# Patient Record
Sex: FEMALE | NOT HISPANIC OR LATINO | Employment: UNEMPLOYED | URBAN - METROPOLITAN AREA
[De-identification: names, ages, dates, MRNs, and addresses within clinical notes are randomized per-mention and may not be internally consistent; named-entity substitution may affect disease eponyms.]

---

## 2023-10-17 PROCEDURE — 88175 CYTOPATH C/V AUTO FLUID REDO: CPT

## 2023-10-19 ENCOUNTER — LAB REQUISITION (OUTPATIENT)
Dept: LAB | Facility: HOSPITAL | Age: 28
End: 2023-10-19
Payer: MEDICAID

## 2023-10-19 DIAGNOSIS — Z01.419 ENCOUNTER FOR GYNECOLOGICAL EXAMINATION (GENERAL) (ROUTINE) WITHOUT ABNORMAL FINDINGS: ICD-10-CM

## 2023-10-19 DIAGNOSIS — Z11.51 ENCOUNTER FOR SCREENING FOR HUMAN PAPILLOMAVIRUS (HPV): ICD-10-CM

## 2023-10-31 LAB
CYTOLOGY CMNT CVX/VAG CYTO-IMP: NORMAL
LAB AP HPV GENOTYPE QUESTION: YES
LAB AP HPV HR: NORMAL
LABORATORY COMMENT REPORT: NORMAL
LMP START DATE: NORMAL
PATH REPORT.TOTAL CANCER: NORMAL

## 2024-01-29 ENCOUNTER — TELEMEDICINE (OUTPATIENT)
Dept: BEHAVIORAL HEALTH | Facility: CLINIC | Age: 29
End: 2024-01-29
Payer: MEDICAID

## 2024-01-29 DIAGNOSIS — F41.8 OTHER SPECIFIED ANXIETY DISORDERS: ICD-10-CM

## 2024-01-29 DIAGNOSIS — F43.10 PTSD (POST-TRAUMATIC STRESS DISORDER): ICD-10-CM

## 2024-01-29 DIAGNOSIS — F32.89 OTHER DEPRESSION: ICD-10-CM

## 2024-01-29 DIAGNOSIS — F44.5 PSYCHOGENIC NONEPILEPTIC SEIZURE: ICD-10-CM

## 2024-01-29 PROCEDURE — 99215 OFFICE O/P EST HI 40 MIN: CPT | Performed by: PSYCHIATRY & NEUROLOGY

## 2024-01-29 RX ORDER — ALBUTEROL SULFATE 90 UG/1
AEROSOL, METERED RESPIRATORY (INHALATION)
COMMUNITY
Start: 2017-09-19 | End: 2024-02-28 | Stop reason: SDUPTHER

## 2024-01-29 RX ORDER — BUSPIRONE HYDROCHLORIDE 10 MG/1
10 TABLET ORAL EVERY 12 HOURS
COMMUNITY
End: 2024-01-29 | Stop reason: SDUPTHER

## 2024-01-29 RX ORDER — BUSPIRONE HYDROCHLORIDE 10 MG/1
10 TABLET ORAL EVERY 12 HOURS
Qty: 60 TABLET | Refills: 2 | Status: SHIPPED | OUTPATIENT
Start: 2024-01-29

## 2024-01-29 RX ORDER — SERTRALINE HYDROCHLORIDE 50 MG/1
50 TABLET, FILM COATED ORAL DAILY
Qty: 30 TABLET | Refills: 2 | Status: SHIPPED | OUTPATIENT
Start: 2024-01-29 | End: 2024-05-28 | Stop reason: SDUPTHER

## 2024-01-29 NOTE — PROGRESS NOTES
"Outpatient Psychiatry Follow up visit    Ms. Sheron Ortiz is a 27-year-old woman with a history of depression, anxiety, PTSD, psychogenic nonepileptic seizures. Follow up done virtually today. I performed this visit using real-time telehealth tools, including an audio/video connection with patient, in car outside home.  Last visit was in 2023.      Subjective:        She says she is \"just surviving.\"     She says that in August, she had to put her dog down and it was pretty hard. She says her stepmother passed away in September. She says she never had to experience losing someone that close. She says she cut herself for a day or two after her mother . She says she also stopped taking her medications for a while. She says she then started taking her buspirone 10mg twice daily. She lost her sertraline bottle in September and has not taken it since then.     She says she took prazosin and it seemed to help but has not taken it since September.      She says she is starting to seeing her therapist in person and plans to do EMDR.      Sleep - \"okay.\" Says she has been sleeping 8 to 9 hours.   Appetite is okay.   She says she cut herself a few times in September; although   She says she has chronic death wishes but denies suicidal thoughts, intent or plans.      She says she has tightness in her back when she is panicked.      Denies any sustained periods of elevated mood, decreased need for sleep, increased activity.      She says she notices shadows or figures. She says she still hears the same voice of 3 males, says they have been there since she was 9; says they have increased since her stepmother  in September.      PTSD - still has night terrors/nightmares.     She says she has had a few PNES episodes in the last several weeks. She says they are the same as before; she says she loses consciousness and has convulsions.   She says she twitches when she notices panic like symptoms but does not " "lose consciousness.   She says it correlates with stress.      She also sees a therapist Caio Malagon at [facility].      She is on Metformin - helps with PCOS.     Stopped smoking about 1 year ago.   Does not drink alcohol   Says she smokes marijuana every day for her stress, anxiety.      Current medications:   Buspirone 10mg twice daily.     Sertraline 50mg daily (not taken since September 2023)  Prazosin 1mg at bedtime (not taken since September 2023)     Past medications:   Lithium - caused stomach problems, weight gain  Vistaril   Vyvanse  Melatonin  Abilify - weight gain     Psychiatric Review Of Systems:     As above.    Medical Review Of Systems:    Pertinent items are noted in HPI.    Record Review: brief       Mental Status Evaluation:  Appearance: fair grooming.   Behavior/Attitude: Cooperative. Pleasant.   Motor: Psychomotor activity in average range. No abnormal involuntary movements.   Speech: Regular in rate, tone and volume. No pressure.  Mood: \"just surviving.\"  Affect: congruent to stated mood. Tearful when talking about losses.   Thought process: Goal-directed. Linear. Organized.  Thought content: No paranoia, delusion or ideas of reference elicited. Reports seeing some shadows. Reports chronic AH with some worsening due to stressors. Does not appear internally stimulated during visit.   Suicidal thoughts: chronic self-harming thoughts as well as death wishes but denied any suicidal ideation, intent or plans. Homicidal ideation: denied.   Insight: Fair.  Judgment: Fair.  Recent and remote memory: intact based on recall of recent and remote autobiographical memories.  Attention/concentration: intact.  Language: No aphasia or paraphasic errors.   Fund of knowledge: Average    PMH/PSH:  Past Medical History:   Diagnosis Date    Acute respiratory distress     Respiratory distress    Allergy status to unspecified drugs, medicaments and biological substances     History of seasonal " allergies    Depression, unspecified 12/21/2013    Depression    Other specified respiratory disorders     Viral respiratory infection    Personal history of other specified conditions     History of chest pain    Personal history of other specified conditions     History of wheezing    Personal history of pneumonia (recurrent)     History of pneumonia    Unspecified asthma, uncomplicated     RAD (reactive airway disease)        Meds  No current outpatient medications on file prior to visit.     No current facility-administered medications on file prior to visit.        Allergies:   Not on File      Assessment/Plan   Assessment:   Ms. Sheron Ortiz is a 28-year-old woman with a history of depression, anxiety, PTSD, psychogenic nonepileptic seizures. Follow up done virtually today.      1/29/24 - Had responded to prazosin but significant losses since last visit. She cut herself for a few days after losing her stepmother. She has been without sertraline or prazosin for a while but has resumed buspirone.   Reports ongoing PNES episodes - has not seen Dr. Sanchez; has been seeing a therapist as well.   Smokes marijuana daily.      Diagnosis:   Anxiety disorder (mixed generalized anxiety and panic disorder)  Psychogenic nonepileptic seizures  PTSD  Mood disorder - does not meet criteria for bipolar type I or II but does report brief periods of mood upswings.   R/O Mood disorder w/ psychosis vs schizoaffective disorder  ?OCD      Treatment Plan/Recommendations:   - Discussed treatment options - will restart sertraline 50mg once daily. May need to titrate dose.   - Continue buspirone 10mg twice daily.   - May consider adding prazosin for nightmares.   - Supportive therapy provided.   - Follow up with appointment to see Dr. Robert Sanchez for psychotherapy for PNES and other therapist to help cope with stressors.   - Continue to monitor for possible psychotic symptoms - reports some increase due to stress.   - Follow  up in 2 months.   - Call sooner if needed.       Review with patient: Treatment plan reviewed with the patient.  Medication risks/benefit reviewed with the patient    Jasmina Patel MD

## 2024-01-29 NOTE — PATIENT INSTRUCTIONS
Plan:   - Resume sertraline 50mg once daily. Monitor for any side effects and contact me if needed.   - Continue buspirone 10mg twice daily.   - We can consider starting prazosin after you are tolerating sertraline. Please update me in a few weeks.   - Continue to see Dr. Robert Sanchez for psychotherapy for PNES.   - Continue psychotherapy.  - Monitor for any significant change in mood, anxiety, thought or perceptions.  - Follow up in 2 months.   - Contact via [employer] or call sooner ([phone]) in case of any questions or concerns.  - Call [phone] for mental health crisis or suicidal thoughts, or call 911 or go to the nearest emergency room for emergencies.       Brain-healthy lifestyle:    Eat heart-healthy diet   Regular physical activity    Regular mental exercise such as reading books, doing puzzles, playing cards/board games etc.    Stay socially engaged   Maintain good sleep hygiene and try to get 7 to 8 hours of continuous sleep at bedtime.

## 2024-02-14 ENCOUNTER — TELEMEDICINE (OUTPATIENT)
Dept: BEHAVIORAL HEALTH | Facility: CLINIC | Age: 29
End: 2024-02-14
Payer: MEDICAID

## 2024-02-14 DIAGNOSIS — F44.5 PSYCHOGENIC NONEPILEPTIC SEIZURE: ICD-10-CM

## 2024-02-14 DIAGNOSIS — F41.8 OTHER SPECIFIED ANXIETY DISORDERS: ICD-10-CM

## 2024-02-14 DIAGNOSIS — F40.01 AGORAPHOBIA WITH PANIC ATTACKS: ICD-10-CM

## 2024-02-14 DIAGNOSIS — F43.10 PTSD (POST-TRAUMATIC STRESS DISORDER): ICD-10-CM

## 2024-02-14 PROCEDURE — 1036F TOBACCO NON-USER: CPT | Performed by: PSYCHOLOGIST

## 2024-02-14 PROCEDURE — 90837 PSYTX W PT 60 MINUTES: CPT | Performed by: PSYCHOLOGIST

## 2024-02-28 ENCOUNTER — OFFICE VISIT (OUTPATIENT)
Dept: PRIMARY CARE | Facility: CLINIC | Age: 29
End: 2024-02-28
Payer: MEDICAID

## 2024-02-28 VITALS
HEIGHT: 64 IN | BODY MASS INDEX: 43.71 KG/M2 | DIASTOLIC BLOOD PRESSURE: 74 MMHG | SYSTOLIC BLOOD PRESSURE: 118 MMHG | TEMPERATURE: 97.6 F | HEART RATE: 78 BPM | WEIGHT: 256 LBS

## 2024-02-28 DIAGNOSIS — Z00.00 PHYSICAL EXAM: Primary | ICD-10-CM

## 2024-02-28 DIAGNOSIS — J45.20 MILD INTERMITTENT REACTIVE AIRWAY DISEASE WITHOUT COMPLICATION (HHS-HCC): ICD-10-CM

## 2024-02-28 PROBLEM — L20.9 ATOPIC DERMATITIS: Status: ACTIVE | Noted: 2024-02-28

## 2024-02-28 PROBLEM — F44.5 DISSOCIATIVE CONVULSIONS: Status: ACTIVE | Noted: 2024-02-28

## 2024-02-28 PROBLEM — F90.0 ATTENTION DEFICIT HYPERACTIVITY DISORDER (ADHD), PREDOMINANTLY INATTENTIVE TYPE: Status: ACTIVE | Noted: 2024-02-28

## 2024-02-28 PROBLEM — F39 MOOD DISORDER (CMS-HCC): Status: ACTIVE | Noted: 2024-02-28

## 2024-02-28 PROBLEM — F44.5 PSYCHOGENIC NONEPILEPTIC SEIZURE: Status: ACTIVE | Noted: 2024-02-28

## 2024-02-28 PROBLEM — J45.909 REACTIVE AIRWAY DISEASE (HHS-HCC): Status: ACTIVE | Noted: 2024-02-28

## 2024-02-28 PROCEDURE — 1036F TOBACCO NON-USER: CPT | Performed by: INTERNAL MEDICINE

## 2024-02-28 PROCEDURE — 99395 PREV VISIT EST AGE 18-39: CPT | Performed by: INTERNAL MEDICINE

## 2024-02-28 RX ORDER — ALBUTEROL SULFATE 90 UG/1
2 AEROSOL, METERED RESPIRATORY (INHALATION) EVERY 4 HOURS PRN
Qty: 18 G | Refills: 3 | Status: SHIPPED | OUTPATIENT
Start: 2024-02-28

## 2024-02-28 RX ORDER — ALBUTEROL SULFATE 0.83 MG/ML
2.5 SOLUTION RESPIRATORY (INHALATION) EVERY 4 HOURS PRN
Qty: 75 ML | Refills: 3 | Status: SHIPPED | OUTPATIENT
Start: 2024-02-28

## 2024-02-28 RX ORDER — ALBUTEROL SULFATE 0.83 MG/ML
2.5 SOLUTION RESPIRATORY (INHALATION) EVERY 4 HOURS PRN
COMMUNITY
Start: 2011-08-08 | End: 2024-02-28 | Stop reason: SDUPTHER

## 2024-02-28 ASSESSMENT — PATIENT HEALTH QUESTIONNAIRE - PHQ9
SUM OF ALL RESPONSES TO PHQ9 QUESTIONS 1 AND 2: 0
2. FEELING DOWN, DEPRESSED OR HOPELESS: NOT AT ALL
1. LITTLE INTEREST OR PLEASURE IN DOING THINGS: NOT AT ALL

## 2024-02-28 ASSESSMENT — PAIN SCALES - GENERAL: PAINLEVEL: 0-NO PAIN

## 2024-02-28 NOTE — PROGRESS NOTES
"Subjective   Patient ID: Sheron Mcmullen is a 28 y.o. female who presents for Annual Exam (Pt here for physical exam. ).    HPI  1.  Physical exam. Pt is doing well. She has no complaints.  She needs med RF.  Pap: last done 10/2023      Review of Systems  All pertinent positive symptoms are included in the history of present illness.    All other systems have been reviewed and are negative and noncontributory to this patient's current ailments.    Past Medical History:   Diagnosis Date    Acute respiratory distress     Respiratory distress    Allergy status to unspecified drugs, medicaments and biological substances     History of seasonal allergies    Depression, unspecified 12/21/2013    Depression    Other specified respiratory disorders     Viral respiratory infection    Personal history of other specified conditions     History of chest pain    Personal history of other specified conditions     History of wheezing    Personal history of pneumonia (recurrent)     History of pneumonia    Unspecified asthma, uncomplicated     RAD (reactive airway disease)     Past Surgical History:   Procedure Laterality Date    OTHER SURGICAL HISTORY  10/30/2020    Tonsillectomy     Social History     Tobacco Use    Smoking status: Former     Types: Cigarettes    Smokeless tobacco: Never     No family history on file.  Allergies   Allergen Reactions    Penicillin G Hives     Immunization History   Administered Date(s) Administered    Pfizer Purple Cap SARS-CoV-2 06/22/2021, 07/13/2021     Current Outpatient Medications   Medication Instructions    albuterol (Ventolin HFA) 90 mcg/actuation inhaler 2 puffs, inhalation, Every 4 hours PRN    albuterol 2.5 mg, nebulization, Every 4 hours PRN    busPIRone (BUSPAR) 10 mg, oral, Every 12 hours    sertraline (ZOLOFT) 50 mg, oral, Daily     Objective   Visit Vitals  /74   Pulse 78   Temp 36.4 °C (97.6 °F)   Ht 1.626 m (5' 4\")   Wt 116 kg (256 lb)   BMI 43.94 kg/m²   Smoking " Status Former   BSA 2.29 m²     No visits with results within 1 Month(s) from this visit.   Latest known visit with results is:   Lab Requisition on 10/17/2023   Component Date Value    Case Report 10/17/2023                      Value:Gynecologic Cytology                              Case: [id number]                                   Authorizing Provider:  Jyotsna Serrato MD    Collected:           10/17/2023 1025              Ordering Location:     [facility]       Received:            10/19/2023 1038                                     Center                                                                       First Screen:          [facility]                                                          Specimen:    ThinPrep Liquid-Based Pap-Imaging System Screen, ECTO/ENDOCERVIX/VAGINA, SCREENING         Final Cytological Interp* 10/17/2023                      Value:This result contains rich text formatting which cannot be displayed here.      10/17/2023                      Value:This result contains rich text formatting which cannot be displayed here.    ThinPrep Imaging System 10/17/2023                      Value:This result contains rich text formatting which cannot be displayed here.    Educational Note 10/17/2023                      Value:This result contains rich text formatting which cannot be displayed here.    Perform HPV HR test? 10/17/2023 Reflex if ASCUS only     Include HPV Genotype? 10/17/2023 Yes     LMP 10/17/2023 10/11/2023      Physical Exam  CONSTITUTIONAL - well nourished, well developed, looks like stated age, in no acute distress, not ill-appearing, and not tired appearing  SKIN - normal skin color and pigmentation, normal skin turgor without rash, lesions, or nodules visualized  HEAD - no trauma, normocephalic  EYES - pupils are equal and reactive to light, extraocular muscles are intact, and normal external exam  ENT - TM's intact, no injection, no signs of  infection, uvula midline, normal tongue movement and throat normal, no exudate, nasal passage without discharge and patent  NECK - supple without rigidity, no neck mass was observed, no thyromegaly or thyroid nodules  CHEST - clear to auscultation, no wheezing, no crackles and no rales, good effort  CARDIAC - regular rate and regular rhythm, no skipped beats, no murmur  ABDOMEN - no organomegaly, soft, nontender, nondistended, normal bowel sounds, no guarding/rebound/rigidity, negative McBurney sign and negative Mcknight sign  EXTREMITIES - no edema, no deformities  NEUROLOGICAL - normal gait, normal balance, normal motor, no ataxia; alert, oriented and no focal signs  PSYCHIATRIC - alert, pleasant and cordial, age-appropriate  IMMUNOLOGIC - no cervical lymphadenopathy    Assessment/Plan   Problem List Items Addressed This Visit       Reactive airway disease    Relevant Medications    albuterol (Ventolin HFA) 90 mcg/actuation inhaler    albuterol 2.5 mg /3 mL (0.083 %) nebulizer solution     Other Visit Diagnoses       Physical exam    -  Primary         Healthy diet and exercise encouraged. Low fat, low salt diet. Drink plenty of fluids. Exercise at least 5 times/week for at least 45 minutes per day.

## 2024-03-01 NOTE — PROGRESS NOTES
Televideo Informed Consent for psychological evaluation was reviewed with the patient as follows:  There are potential benefits and risks of the use of telephone or video-conferencing that differ from in-person sessions. Specifically, the telephone or televideo system we are using may not be HIPPA compliant and may present limits to patient confidentiality. Confidentiality still applies for telepsychology services, and nobody will record the session without your permission.     Understanding and verbal agreement was attested to by the patient. Patient identity was confirmed using 3 sources, including telephone number, address and date of birth.     Sheron is a 28-year-old woman with PNES referred by Dr. Patel who returns for cognitive behavioral psychotherapy after an absence of about 14 months. She has reported that she has been experiencing seizures since .  At her last visit, she reported that these episodes occur  as frequently as 2-3 times daily.  Currently, she reports frequency of 3 times per week on average.  Unlike before, she seems to have lost any sort of prodromal awareness of any impending episode.  Triggers seem to be stress, anxiety, driving at night and exposure to bright lights. She has described the seizures as whole body tremors with fists clenching, sometimes leading to, or consequence of, panic. She recovers by sleeping.     We will resume cognitive behavioral effort to work on exposure to anxiety-provoking stimuli and an exposure plus response prevention model for dealing with agoraphobia from a cognitive behavioral perspective.    Our session addressed several issues, includin. We reviewed recent history since her last visit and her reason for returning to psychotherapy after this long absence.  We re-established a set of goals, including;  A.  Addressing agoraphobia and her strategies of fighting through it with walking and taking trips out of state  B.  Pursuit of grief  "counseling following the passing of her stepmother last year.  She notes that her stepmother  of cancer, the same cancer that her mother has, bladder cancer.  She is also experiencing grief over having to euthanize her dog.  C.  As a consequence of her grief, she states, she stopped taking sertraline and buspirone and reports that she \"kind of checked out\" since she has never lost a family member before.  The minutes fell off her radar and we reviewed the importance of resuming those medications and the care of Dr. Patel.  D.  She continues to see a therapist every other week at [facility] to review everyday life, anger, repeated regression and OCD.  E.  After she has stabilized again on her regimen of sertraline, I asked that she self monitor her episodes for 2 weeks and then we will schedule a follow-up meeting in 2 months.    2. She reports that she is experienced escalating anxiety and that she has increasingly felt emotionally detached from her friends and from herself. She characterizes herself as \"blah,\" and not present, a persistent out-of-it feeling that has been consistent for the past 2 months. Her only sense of real connection is to her boyfriend and to her mother.    3. She reports that she has had worsening compulsive behavior and she describes a ritual in which she has to rub cigarette marianne between her fingers each time she smokes a cigarette until she achieves the right field. That ritual is \"like an indoor fun\" and that it leads to a sense of relief and a sense of calm after repeating that ritual 4 times.    4. Her relationship with her boyfriend remains strong. She plans to get engaged next year. He works in a bake shop and she is quite open about the frequency with which she smokes marijuana. She reports that weed helps with anxiety and seizures. She is still meeting with a humanistic therapist twice monthly and says that the therapist knows all about her we use. She sees her " therapist every other Monday and has seen her since she was 14 years old. Together they work on anxiety, breathing and mindfulness.    5. Her episodes continue to happen frequently, and she estimates 3-4/week on average. Her episodes are related to stressors of various kinds including the stress of leaving her house. She reports deepa agoraphobia and she never leaves the house by herself as a consequence of her fear.    6. She has not worked in the past 3 years and would not know how to function at work or being around strangers. Her most recent employment was at [employer] and previously she had worked at fast food establishments. 2 years ago she was awarded Social Security disability and reports that she has enough money to handle her own expenses. Pointedly, we discussed that she has no financial motivation to address her agoraphobia for to return to work.    7. In pursuing exactly what her motives are, the best she could say was that she does not want to continue constantly to feel anxious and emotionally detached. We detailed, as we have previously, that avoidance is an anxiety reinforcer and that my recommendation would be that she embark on a structured program of exposure to anxiety-provoking events, including leaving her house. She has a very good and trusting relationship with her therapist who works for humanistic counseling in their [place] office. I suggested that I would be happy to talk with her therapist, learn about whether she has conceptualized her anxiety issues as I have, and to determine whether she would be able to carry out a structured exposure approach to addressing anxiety, PTSD and agoraphobia. The patient is on board with this recommendation and we will secure a release of information so that I can talk to her therapist.

## 2024-04-01 ENCOUNTER — APPOINTMENT (OUTPATIENT)
Dept: BEHAVIORAL HEALTH | Facility: CLINIC | Age: 29
End: 2024-04-01
Payer: MEDICAID

## 2024-04-17 ENCOUNTER — APPOINTMENT (OUTPATIENT)
Dept: BEHAVIORAL HEALTH | Facility: CLINIC | Age: 29
End: 2024-04-17
Payer: MEDICAID

## 2024-04-22 ENCOUNTER — TELEMEDICINE (OUTPATIENT)
Dept: BEHAVIORAL HEALTH | Facility: CLINIC | Age: 29
End: 2024-04-22
Payer: MEDICAID

## 2024-04-22 DIAGNOSIS — F40.01 AGORAPHOBIA WITH PANIC ATTACKS: ICD-10-CM

## 2024-04-22 DIAGNOSIS — F43.10 PTSD (POST-TRAUMATIC STRESS DISORDER): ICD-10-CM

## 2024-04-22 DIAGNOSIS — F32.89 OTHER DEPRESSION: ICD-10-CM

## 2024-04-22 DIAGNOSIS — F44.5 PSYCHOGENIC NONEPILEPTIC SEIZURE: ICD-10-CM

## 2024-04-22 PROCEDURE — 90832 PSYTX W PT 30 MINUTES: CPT | Performed by: PSYCHOLOGIST
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
## 2024-04-26 ENCOUNTER — TELEMEDICINE (OUTPATIENT)
Dept: BEHAVIORAL HEALTH | Facility: CLINIC | Age: 29
End: 2024-04-26
Payer: MEDICAID

## 2024-04-26 DIAGNOSIS — F40.01 AGORAPHOBIA WITH PANIC ATTACKS: ICD-10-CM

## 2024-04-26 DIAGNOSIS — F41.8 OTHER SPECIFIED ANXIETY DISORDERS: ICD-10-CM

## 2024-04-26 DIAGNOSIS — F32.89 OTHER DEPRESSION: ICD-10-CM

## 2024-04-26 DIAGNOSIS — F43.10 PTSD (POST-TRAUMATIC STRESS DISORDER): ICD-10-CM

## 2024-04-26 DIAGNOSIS — F44.5 PSYCHOGENIC NONEPILEPTIC SEIZURE: ICD-10-CM

## 2024-04-26 PROCEDURE — 99213 OFFICE O/P EST LOW 20 MIN: CPT | Performed by: PSYCHIATRY & NEUROLOGY

## 2024-04-26 NOTE — PATIENT INSTRUCTIONS
Plan:       - Continue sertraline 50mg daily.   - Continue buspirone 10mg twice daily.   - Continue to see Dr. Robert Sanchez for psychotherapy for PNES and other therapist to help cope with stressors.   - Agree with trying an emotional support animal.   - Continue to monitor for any changes in mood or thinking.    - Follow up in 3 months.   - Contact via [employer] or call sooner ([phone]) in case of any questions or concerns.  - Call [phone] for mental health crisis or suicidal thoughts, or call 911 or go to the nearest emergency room for emergencies.

## 2024-04-26 NOTE — PROGRESS NOTES
"Outpatient Psychiatry Follow up visit    Ms. Sheron Ortiz is a 27-year-old woman with a history of depression, anxiety, PTSD, psychogenic nonepileptic seizures. Follow up done virtually today. I performed this visit using real-time telehealth tools, including an audio/video connection with patient (at home).   Her mother joined with patient's permission.     Subjective:      She says she is \"okay.\"    She says she is doing \"pretty good.\" She feels sertraline 50mg daily is helping.   She says she is taking buspirone 10mg daily, not twice daily. She feels she is doing well on     She is planning to get a puppy as an emotional support animal.  She feels it will help her with her anxiety.     Her mother notes that patient had a change in her relationship recently - there was a lot of stress but with the change, she has not been as stressed. She also notes that Sheron wants to be able to go out on her own and an emotional support animal would help.     She says she is starting to seeing her therapist in person and plans to do EMDR.      Sleep - \"okay.\" Says she has been sleeping 8 to 9 hours.   Appetite is okay.   No self-cutting behaviors recently.   She denies suicidal thoughts, intent or plans.      They report less stress.      Denies any sustained periods of elevated mood, decreased need for sleep, increased activity.      PTSD - says she has not had night terrors for 2 weeks.     She denies any panic attacks.     She says she has had some seizures in her sleep.      She also sees a therapist Caio Malagon at [facility].      She is on Metformin - helps with PCOS.     Stopped smoking about 1 year ago.   Does not drink alcohol   Says she smokes marijuana every day for her stress, anxiety.      Current medications:   Buspirone 10mg once daily  Sertraline 50mg daily.     Past medications:   Prazosin 1mg at bedtime (not taken since September 2023)  Lithium - caused stomach problems, weight " "gain  Vistaril   Vyvanse  Melatonin  Abilify - weight gain     Psychiatric Review Of Systems:     As above.    Medical Review Of Systems:    Pertinent items are noted in HPI.    Record Review: brief       Mental Status Evaluation:  Appearance: fair grooming.   Behavior/Attitude: Cooperative. Pleasant.   Motor: Psychomotor activity in average range. No abnormal involuntary movements.   Speech: Regular in rate, tone and volume. No pressure.  Mood: \"okay.\"  Affect: congruent to stated mood. Not tearful today.   Thought process: Goal-directed. Linear. Organized.  Thought content: No paranoia, delusion or ideas of reference elicited. Did not appear internally stimulated. Reports chronic AH.   Suicidal thoughts: denies any recent self-harming thoughts or behaviors or any suicidal ideation, intent or plans.   Homicidal ideation: denied.   Insight: Fair.  Judgment: Fair.  Recent and remote memory: intact based on recall of recent and remote autobiographical memories.  Attention/concentration: intact.  Language: No aphasia or paraphasic errors.   Fund of knowledge: Average    PMH/PSH:  Past Medical History:   Diagnosis Date    Acute respiratory distress     Respiratory distress    Allergy status to unspecified drugs, medicaments and biological substances     History of seasonal allergies    Depression, unspecified 12/21/2013    Depression    Other specified respiratory disorders     Viral respiratory infection    Personal history of other specified conditions     History of chest pain    Personal history of other specified conditions     History of wheezing    Personal history of pneumonia (recurrent)     History of pneumonia    Unspecified asthma, uncomplicated ([facility])     RAD (reactive airway disease)        Meds  Current Outpatient Medications on File Prior to Visit   Medication Sig Dispense Refill    albuterol (Ventolin HFA) 90 mcg/actuation inhaler Inhale 2 puffs every 4 hours if needed for wheezing or shortness of " breath. 18 g 3    albuterol 2.5 mg /3 mL (0.083 %) nebulizer solution Take 3 mL (2.5 mg) by nebulization every 4 hours if needed for wheezing or shortness of breath. 75 mL 3    busPIRone (Buspar) 10 mg tablet Take 1 tablet (10 mg) by mouth every 12 hours. 60 tablet 2    sertraline (Zoloft) 50 mg tablet Take 1 tablet (50 mg) by mouth once daily. 30 tablet 2     No current facility-administered medications on file prior to visit.        Allergies:   Allergies   Allergen Reactions    Penicillin G Hives         Assessment/Plan   Assessment:   Ms. Sheron Ortiz is a 28-year-old woman with a history of depression, anxiety, PTSD, psychogenic nonepileptic seizures. Follow up done virtually today.      4/26/24 - Reports improved mood and anxiety on current regimen (sertraline 50mg/day and buspirone 10mg/day). Had a recent change in relationship status that has decreased stress. 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   Wants to get a puppy as an emotional support animal.   Smokes marijuana daily.      Diagnosis:   Anxiety disorder (mixed generalized anxiety and panic disorder)  Psychogenic nonepileptic seizures  PTSD  Mood disorder - does not meet criteria for bipolar type I or II but does report brief periods of mood upswings.   R/O Mood disorder w/ psychosis vs schizoaffective disorder  ?OCD    Treatment Plan/Recommendations:   - Continue sertraline 50mg daily.   - Continue buspirone 10mg twice daily.   - Supportive therapy provided.   - Continue to see Dr. Robert Sanchez for psychotherapy for PNES and other therapist to help cope with stressors.   - Agree with trying an emotional support animal to help improve functioning.   - Continue to monitor for possible psychotic symptoms - reports some increase due to stress.   - Follow up in 3 months.         Review with patient: Treatment plan reviewed with the patient.  Medication risks/benefit reviewed with the patient    Jasmina Patel MD

## 2024-05-28 DIAGNOSIS — F32.89 OTHER DEPRESSION: ICD-10-CM

## 2024-05-28 RX ORDER — SERTRALINE HYDROCHLORIDE 50 MG/1
50 TABLET, FILM COATED ORAL DAILY
Qty: 90 TABLET | Refills: 3 | Status: SHIPPED | OUTPATIENT
Start: 2024-05-28 | End: 2025-05-28

## 2024-07-26 ENCOUNTER — APPOINTMENT (OUTPATIENT)
Dept: PRIMARY CARE | Facility: CLINIC | Age: 29
End: 2024-07-26
Payer: MEDICAID

## 2024-08-27 ENCOUNTER — OFFICE VISIT (OUTPATIENT)
Dept: PRIMARY CARE | Facility: CLINIC | Age: 29
End: 2024-08-27
Payer: MEDICAID

## 2024-08-27 VITALS
WEIGHT: 259 LBS | TEMPERATURE: 97.1 F | DIASTOLIC BLOOD PRESSURE: 78 MMHG | SYSTOLIC BLOOD PRESSURE: 120 MMHG | BODY MASS INDEX: 44.46 KG/M2 | HEART RATE: 73 BPM | OXYGEN SATURATION: 97 %

## 2024-08-27 DIAGNOSIS — E28.2 PCOS (POLYCYSTIC OVARIAN SYNDROME): Primary | ICD-10-CM

## 2024-08-27 DIAGNOSIS — R56.9 SEIZURE (MULTI): ICD-10-CM

## 2024-08-27 PROCEDURE — 1036F TOBACCO NON-USER: CPT | Performed by: INTERNAL MEDICINE

## 2024-08-27 PROCEDURE — 99214 OFFICE O/P EST MOD 30 MIN: CPT | Performed by: INTERNAL MEDICINE

## 2024-08-27 RX ORDER — METFORMIN HYDROCHLORIDE 500 MG/1
500 TABLET ORAL
Qty: 180 TABLET | Refills: 3 | Status: SHIPPED | OUTPATIENT
Start: 2024-08-27 | End: 2025-08-27

## 2024-08-27 ASSESSMENT — ENCOUNTER SYMPTOMS
POLYDIPSIA: 0
FEVER: 0
SHORTNESS OF BREATH: 0
CONSTIPATION: 0
HEADACHES: 0
SEIZURES: 1
DIZZINESS: 0
UNEXPECTED WEIGHT CHANGE: 0
CHILLS: 0
DIARRHEA: 0
POLYPHAGIA: 0
VOMITING: 0
ABDOMINAL PAIN: 0
NAUSEA: 0

## 2024-08-27 ASSESSMENT — PAIN SCALES - GENERAL: PAINLEVEL: 0-NO PAIN

## 2024-08-27 ASSESSMENT — PATIENT HEALTH QUESTIONNAIRE - PHQ9
SUM OF ALL RESPONSES TO PHQ9 QUESTIONS 1 AND 2: 0
1. LITTLE INTEREST OR PLEASURE IN DOING THINGS: NOT AT ALL
2. FEELING DOWN, DEPRESSED OR HOPELESS: NOT AT ALL

## 2024-08-27 NOTE — PROGRESS NOTES
Subjective   Patient ID: Sheron Mcmullen is a 28 y.o. female who presents for MED REVIEW.    This is a 28 y.o. with PCOS who would like to be restarted on Metformin. She states that it helped her to lose weight previously. She is also interested in labs.           Review of Systems   Constitutional:  Negative for chills, fever and unexpected weight change.   Respiratory:  Negative for shortness of breath.    Cardiovascular:  Negative for chest pain.   Gastrointestinal:  Negative for abdominal pain, constipation, diarrhea, nausea and vomiting.   Endocrine: Negative for polydipsia, polyphagia and polyuria.   Neurological:  Positive for seizures. Negative for dizziness and headaches.       Objective   /78   Pulse 73   Temp 36.2 °C (97.1 °F)   Wt 117 kg (259 lb)   SpO2 97%   BMI 44.46 kg/m²     Physical Exam  Vitals reviewed.   Constitutional:       General: She is not in acute distress.     Appearance: Normal appearance. She is obese. She is not ill-appearing.   Cardiovascular:      Rate and Rhythm: Normal rate and regular rhythm.      Heart sounds: Normal heart sounds.   Pulmonary:      Effort: Pulmonary effort is normal.      Breath sounds: Normal breath sounds.   Musculoskeletal:         General: No swelling. Normal range of motion.   Skin:     General: Skin is warm and dry.   Neurological:      General: No focal deficit present.      Mental Status: She is alert and oriented to person, place, and time.   Psychiatric:         Mood and Affect: Mood normal.         Behavior: Behavior normal.         Assessment/Plan   Diagnoses and all orders for this visit:  PCOS (polycystic ovarian syndrome)  -     Lipid Panel; Future  -     Comprehensive Metabolic Panel; Future  -     CBC; Future  -     TSH with reflex to Free T4 if abnormal; Future  -     metFORMIN (Glucophage) 500 mg tablet; Take 1 tablet (500 mg) by mouth 2 times daily (morning and late afternoon).  -     Hemoglobin A1C; Future  Seizure  (Multi)  Comments:  Pseudoseizures have been stable.

## 2024-10-29 PROCEDURE — 87591 N.GONORRHOEAE DNA AMP PROB: CPT

## 2024-10-29 PROCEDURE — 87491 CHLMYD TRACH DNA AMP PROBE: CPT

## 2024-10-31 ENCOUNTER — LAB REQUISITION (OUTPATIENT)
Dept: LAB | Facility: HOSPITAL | Age: 29
End: 2024-10-31
Payer: MEDICAID

## 2024-10-31 DIAGNOSIS — Z11.51 ENCOUNTER FOR SCREENING FOR HUMAN PAPILLOMAVIRUS (HPV): ICD-10-CM

## 2024-10-31 DIAGNOSIS — Z12.4 ENCOUNTER FOR SCREENING FOR MALIGNANT NEOPLASM OF CERVIX: ICD-10-CM

## 2024-10-31 DIAGNOSIS — Z01.419 ENCOUNTER FOR GYNECOLOGICAL EXAMINATION (GENERAL) (ROUTINE) WITHOUT ABNORMAL FINDINGS: ICD-10-CM

## 2024-10-31 LAB
C TRACH RRNA SPEC QL NAA+PROBE: NEGATIVE
N GONORRHOEA DNA SPEC QL PROBE+SIG AMP: NEGATIVE

## 2024-11-13 ENCOUNTER — OFFICE VISIT (OUTPATIENT)
Dept: PRIMARY CARE | Facility: CLINIC | Age: 29
End: 2024-11-13
Payer: MEDICAID

## 2024-11-13 VITALS
DIASTOLIC BLOOD PRESSURE: 80 MMHG | SYSTOLIC BLOOD PRESSURE: 114 MMHG | WEIGHT: 260 LBS | BODY MASS INDEX: 44.39 KG/M2 | HEART RATE: 86 BPM | OXYGEN SATURATION: 98 % | HEIGHT: 64 IN | TEMPERATURE: 97.1 F

## 2024-11-13 DIAGNOSIS — G44.85 PRIMARY STABBING HEADACHE: Primary | ICD-10-CM

## 2024-11-13 DIAGNOSIS — E66.01 MORBID OBESITY WITH BMI OF 40.0-44.9, ADULT (MULTI): ICD-10-CM

## 2024-11-13 DIAGNOSIS — N94.6 DYSMENORRHEA: ICD-10-CM

## 2024-11-13 PROCEDURE — 3008F BODY MASS INDEX DOCD: CPT | Performed by: INTERNAL MEDICINE

## 2024-11-13 PROCEDURE — 99213 OFFICE O/P EST LOW 20 MIN: CPT | Performed by: INTERNAL MEDICINE

## 2024-11-13 PROCEDURE — 1036F TOBACCO NON-USER: CPT | Performed by: INTERNAL MEDICINE

## 2024-11-13 RX ORDER — FLUCONAZOLE 150 MG/1
150 TABLET ORAL DAILY
COMMUNITY
Start: 2024-10-29

## 2024-11-13 RX ORDER — IBUPROFEN 800 MG/1
800 TABLET ORAL 3 TIMES DAILY PRN
Qty: 90 TABLET | Refills: 3 | Status: SHIPPED | OUTPATIENT
Start: 2024-11-13

## 2024-11-13 ASSESSMENT — ENCOUNTER SYMPTOMS
PHOTOPHOBIA: 0
VOMITING: 0
WEAKNESS: 0
SHORTNESS OF BREATH: 0
FATIGUE: 0
ACTIVITY CHANGE: 0
NUMBNESS: 0
HEADACHES: 1
PALPITATIONS: 0
CHILLS: 0
FEVER: 0
DIZZINESS: 0
DIARRHEA: 0
APPETITE CHANGE: 0

## 2024-11-13 ASSESSMENT — PATIENT HEALTH QUESTIONNAIRE - PHQ9
2. FEELING DOWN, DEPRESSED OR HOPELESS: NOT AT ALL
1. LITTLE INTEREST OR PLEASURE IN DOING THINGS: NOT AT ALL
SUM OF ALL RESPONSES TO PHQ9 QUESTIONS 1 AND 2: 0

## 2024-11-13 ASSESSMENT — PAIN SCALES - GENERAL: PAINLEVEL_OUTOF10: 2

## 2024-11-14 ENCOUNTER — TELEPHONE (OUTPATIENT)
Dept: SURGERY | Facility: CLINIC | Age: 29
End: 2024-11-14
Payer: MEDICAID

## 2024-11-14 NOTE — PROGRESS NOTES
"Subjective   Patient ID: Sheron Mcmullen is a 28 y.o. female who presents for head ach.    This is a 28 y.o. who states that she had a stabbing HA on the left posterior aspect of her head. It lasted a few days and correlated with the starting of her menses. She did not take any medication. No fever, chills, dizziness, visual disturbance, light sensitivity.  She is also c/o horrible menstrual cramps. Her cramps are incapacitating on the first day of her menses.  Pt is also concerned about her weight and is considering bariatric surgery.         Review of Systems   Constitutional:  Negative for activity change, appetite change, chills, fatigue and fever.   Eyes:  Negative for photophobia and visual disturbance.   Respiratory:  Negative for shortness of breath.    Cardiovascular:  Negative for chest pain and palpitations.   Gastrointestinal:  Negative for diarrhea and vomiting.   Genitourinary:         See HPI   Neurological:  Positive for headaches. Negative for dizziness, weakness and numbness.       Objective   /80   Pulse 86   Temp 36.2 °C (97.1 °F)   Ht 1.626 m (5' 4\")   Wt 118 kg (260 lb)   SpO2 98%   BMI 44.63 kg/m²     Physical Exam  Vitals reviewed.   Constitutional:       General: She is not in acute distress.     Appearance: Normal appearance. She is obese. She is not ill-appearing.   Eyes:      Extraocular Movements: Extraocular movements intact.   Cardiovascular:      Rate and Rhythm: Normal rate and regular rhythm.      Heart sounds: Normal heart sounds.   Pulmonary:      Effort: Pulmonary effort is normal.      Breath sounds: Normal breath sounds.   Skin:     General: Skin is warm and dry.   Neurological:      General: No focal deficit present.      Mental Status: She is alert and oriented to person, place, and time.   Psychiatric:         Mood and Affect: Mood normal.         Assessment/Plan   Diagnoses and all orders for this visit:  Primary stabbing " headache  Comments:  Resolved  Dysmenorrhea  Comments:  Start taking Ibuprofen, 48 hrs prior to start of menses  Orders:  -     ibuprofen 800 mg tablet; Take 1 tablet (800 mg) by mouth 3 times a day as needed for mild pain (1 - 3) (pain).  Morbid obesity with BMI of 40.0-44.9, adult (Multi)  -     Referral to Bariatric Surgery; Future  Healthy diet and exercise encouraged. Low fat, low salt diet. Drink plenty of fluids. Exercise at least 5 times/week for at least 45 minutes per day.

## 2024-11-15 LAB
CYTOLOGY CMNT CVX/VAG CYTO-IMP: NORMAL
LAB AP HPV GENOTYPE QUESTION: YES
LAB AP HPV HR: NORMAL
LAB AP PAP ADDITIONAL TESTS: NORMAL
LAB AP PREVIOUS ABNORMAL HISTORY: NORMAL
LABORATORY COMMENT REPORT: NORMAL
LMP START DATE: NORMAL
PATH REPORT.TOTAL CANCER: NORMAL

## 2024-11-21 ENCOUNTER — TELEPHONE (OUTPATIENT)
Dept: SURGERY | Facility: CLINIC | Age: 29
End: 2024-11-21
Payer: MEDICAID

## 2024-12-18 ENCOUNTER — OFFICE VISIT (OUTPATIENT)
Dept: PRIMARY CARE | Facility: CLINIC | Age: 29
End: 2024-12-18
Payer: MEDICAID

## 2024-12-18 VITALS
DIASTOLIC BLOOD PRESSURE: 78 MMHG | HEART RATE: 85 BPM | TEMPERATURE: 97.7 F | WEIGHT: 256 LBS | BODY MASS INDEX: 43.94 KG/M2 | OXYGEN SATURATION: 92 % | SYSTOLIC BLOOD PRESSURE: 120 MMHG

## 2024-12-18 DIAGNOSIS — J06.9 UPPER RESPIRATORY TRACT INFECTION, UNSPECIFIED TYPE: Primary | ICD-10-CM

## 2024-12-18 PROCEDURE — 1036F TOBACCO NON-USER: CPT | Performed by: INTERNAL MEDICINE

## 2024-12-18 PROCEDURE — 99213 OFFICE O/P EST LOW 20 MIN: CPT | Performed by: INTERNAL MEDICINE

## 2024-12-18 RX ORDER — BENZONATATE 200 MG/1
200 CAPSULE ORAL 3 TIMES DAILY PRN
Qty: 30 CAPSULE | Refills: 0 | Status: SHIPPED | OUTPATIENT
Start: 2024-12-18

## 2024-12-18 RX ORDER — FLUTICASONE PROPIONATE 50 MCG
1 SPRAY, SUSPENSION (ML) NASAL DAILY
Qty: 16 G | Refills: 0 | Status: SHIPPED | OUTPATIENT
Start: 2024-12-18 | End: 2025-12-18

## 2024-12-18 RX ORDER — CETIRIZINE HYDROCHLORIDE, PSEUDOEPHEDRINE HYDROCHLORIDE 5; 120 MG/1; MG/1
1 TABLET, FILM COATED, EXTENDED RELEASE ORAL 2 TIMES DAILY PRN
Qty: 14 TABLET | Refills: 0 | Status: SHIPPED | OUTPATIENT
Start: 2024-12-18

## 2024-12-18 ASSESSMENT — ENCOUNTER SYMPTOMS
DIZZINESS: 0
FATIGUE: 0
FEVER: 0
COUGH: 1
SHORTNESS OF BREATH: 0
HEADACHES: 0
WHEEZING: 0
CHILLS: 0

## 2024-12-18 ASSESSMENT — PATIENT HEALTH QUESTIONNAIRE - PHQ9
2. FEELING DOWN, DEPRESSED OR HOPELESS: NOT AT ALL
SUM OF ALL RESPONSES TO PHQ9 QUESTIONS 1 AND 2: 0
1. LITTLE INTEREST OR PLEASURE IN DOING THINGS: NOT AT ALL

## 2024-12-18 ASSESSMENT — PAIN SCALES - GENERAL: PAINLEVEL_OUTOF10: 0-NO PAIN

## 2024-12-18 NOTE — PROGRESS NOTES
Subjective   Patient ID: Sheron Mcmullen is a 29 y.o. female who presents for Cough.    This is a 29 y.o. who was diagnosed with a URI on 12/10. Pt states that she feels better, but she still has a cough. Her sputum is clear. She continues to have ear discomfort and fullness. No drainage, fever, chills.         Review of Systems   Constitutional:  Negative for chills, fatigue and fever.   HENT:          See HPI   Respiratory:  Positive for cough. Negative for shortness of breath and wheezing.    Cardiovascular:  Negative for chest pain.   Neurological:  Negative for dizziness and headaches.       Objective   /78   Pulse 85   Temp 36.5 °C (97.7 °F)   Wt 116 kg (256 lb)   SpO2 92%   BMI 43.94 kg/m²     Physical Exam  Vitals reviewed.   Constitutional:       General: She is not in acute distress.     Appearance: Normal appearance. She is not ill-appearing or toxic-appearing.   HENT:      Right Ear: Tympanic membrane normal.      Left Ear: Tympanic membrane normal.      Nose: Nose normal.   Cardiovascular:      Rate and Rhythm: Normal rate and regular rhythm.      Heart sounds: Normal heart sounds.   Pulmonary:      Effort: Pulmonary effort is normal.      Breath sounds: Normal breath sounds. No wheezing, rhonchi or rales.   Neurological:      General: No focal deficit present.      Mental Status: She is alert and oriented to person, place, and time.   Psychiatric:         Mood and Affect: Mood normal.         Assessment/Plan   Diagnoses and all orders for this visit:  Upper respiratory tract infection, unspecified type  Comments:  ER records reviewed  Orders:  -     cetirizine-pseudoephedrine (ZyrTEC-D) 5-120 mg 12 hr tablet; Take 1 tablet by mouth 2 times a day as needed for allergies.  -     fluticasone (Flonase) 50 mcg/actuation nasal spray; Administer 1 spray into each nostril once daily. Shake gently. Before first use, prime pump. After use, clean tip and replace cap.  -     benzonatate (Tessalon)  200 mg capsule; Take 1 capsule (200 mg) by mouth 3 times a day as needed for cough. Do not crush or chew.

## 2025-01-03 ENCOUNTER — APPOINTMENT (OUTPATIENT)
Dept: BEHAVIORAL HEALTH | Facility: CLINIC | Age: 30
End: 2025-01-03
Payer: MEDICAID

## 2025-01-03 DIAGNOSIS — F41.8 OTHER SPECIFIED ANXIETY DISORDERS: ICD-10-CM

## 2025-01-03 DIAGNOSIS — F32.89 OTHER DEPRESSION: ICD-10-CM

## 2025-01-03 DIAGNOSIS — F44.5 PSYCHOGENIC NONEPILEPTIC SEIZURE: ICD-10-CM

## 2025-01-03 DIAGNOSIS — F43.10 PTSD (POST-TRAUMATIC STRESS DISORDER): ICD-10-CM

## 2025-01-03 PROCEDURE — 99214 OFFICE O/P EST MOD 30 MIN: CPT | Performed by: PSYCHIATRY & NEUROLOGY

## 2025-01-03 RX ORDER — PRAZOSIN HYDROCHLORIDE 1 MG/1
1 CAPSULE ORAL NIGHTLY
Qty: 30 CAPSULE | Refills: 3 | Status: SHIPPED | OUTPATIENT
Start: 2025-01-03 | End: 2026-01-03

## 2025-01-03 NOTE — PROGRESS NOTES
"Outpatient Psychiatry Follow up visit    Ms. Sheron Ortiz is a 27-year-old woman with a history of depression, anxiety, PTSD, psychogenic nonepileptic seizures. Follow up done virtually today.     Virtual or Telephone Consent    An interactive audio and video telecommunication system which permits real time communications between the patient (at the originating site) and provider (at the distant site) was utilized to provide this telehealth service.   Verbal consent was requested and obtained from Sheron Mcmullen on this date, 01/03/25 for a telehealth visit.     Subjective:      She says she is \"okay.\"  She says she has an emotional support dog and she has helped her quite a bit.     She says she is doing \"better.\" She says she was \"depressed\" around the election but is now feeling better. She feels sertraline 50mg daily is helping.   She says she is taking buspirone 10mg twice daily - she says it has helped.     She says that she has had a few panic attacks in the evening.     She says that she has had \"seizures\" because she was \"stressed out\" around the elections but they have decreased. She says that if she feels it coming on, she tries to not get \"so revved up,\" and then just \"let it happen.\"     She says she is seeing her therapist.      Sleep - \"better.\" She says she still has \"night frights.\" She takes sertraline at nighttime because it was easy to remember.   Appetite is okay.   She says she has had thoughts of cutting but has not done it. She says journaling helps.   She denies suicidal thoughts, intent or plans.      She says she was \"manic\" for 4 or 5 days - she felt \"euphoric,\" and thought she had \"figured out world peace.\" She says she \"crashed\" for 3 days after that.   Denies any sustained periods of decreased need for sleep, increased activity.      PTSD - says she continues to have \"night frights.\"     She says she has heard \"3 voices\" \"all day every day\" since she was 9. She says it " "is louder when she is stressed, or depressed. She says when she is calm, the voices are still there but they \"subside.\"     She denies any panic attacks.      She also sees a therapist Caio Malagon at [facility].      She is on Metformin - helps with PCOS.     Stopped smoking about 1 year ago.   Does not drink alcohol   Says she smokes marijuana every day for her stress, anxiety.      Current medications:   Buspirone 10mg twice daily  Sertraline 50mg daily.     Past medications:   Prazosin 1mg at bedtime (not taken since September 2023)  Lithium - caused stomach problems, weight gain  Vistaril   Vyvanse  Melatonin  Abilify - weight gain     Psychiatric Review Of Systems:     As above.    Medical Review Of Systems:    Pertinent items are noted in HPI.    Record Review: brief       Mental Status Evaluation:  Appearance: fair grooming. Had her dog with her.  Behavior/Attitude: Cooperative. Pleasant.   Motor: Psychomotor activity in average range. No abnormal involuntary movements.   Speech: Regular in rate, tone and volume. No pressure.  Mood: \"okay.\"  Affect: congruent to stated mood. Not tearful or dysphoric.   Thought process: Goal-directed. Linear. Organized.  Thought content: No paranoia, delusion or ideas of reference elicited. Did not appear internally stimulated. Reports chronic AH.   Suicidal thoughts: reports intermittent self-harming thoughts but denies any self-harming behaviors or any suicidal ideation, intent or plans.   Homicidal ideation: denied.   Insight: Fair.  Judgment: Fair.  Recent and remote memory: intact based on recall of recent and remote autobiographical memories.  Attention/concentration: intact.  Language: No aphasia or paraphasic errors.   Fund of knowledge: Average    PMH/PSH:  Past Medical History:   Diagnosis Date    Acute respiratory distress     Respiratory distress    Allergy status to unspecified drugs, medicaments and biological substances     History of seasonal " allergies    Depression, unspecified 12/21/2013    Depression    Other specified respiratory disorders     Viral respiratory infection    Personal history of other specified conditions     History of chest pain    Personal history of other specified conditions     History of wheezing    Personal history of pneumonia (recurrent)     History of pneumonia    Unspecified asthma, uncomplicated ([facility])     RAD (reactive airway disease)        Meds  Current Outpatient Medications on File Prior to Visit   Medication Sig Dispense Refill    albuterol (Ventolin HFA) 90 mcg/actuation inhaler Inhale 2 puffs every 4 hours if needed for wheezing or shortness of breath. 18 g 3    albuterol 2.5 mg /3 mL (0.083 %) nebulizer solution Take 3 mL (2.5 mg) by nebulization every 4 hours if needed for wheezing or shortness of breath. 75 mL 3    benzonatate (Tessalon) 200 mg capsule Take 1 capsule (200 mg) by mouth 3 times a day as needed for cough. Do not crush or chew. 30 capsule 0    busPIRone (Buspar) 10 mg tablet Take 1 tablet (10 mg) by mouth every 12 hours. 60 tablet 2    cetirizine-pseudoephedrine (ZyrTEC-D) 5-120 mg 12 hr tablet Take 1 tablet by mouth 2 times a day as needed for allergies. 14 tablet 0    fluconazole (Diflucan) 150 mg tablet Take 1 tablet (150 mg) by mouth once daily.      fluticasone (Flonase) 50 mcg/actuation nasal spray Administer 1 spray into each nostril once daily. Shake gently. Before first use, prime pump. After use, clean tip and replace cap. 16 g 0    ibuprofen 800 mg tablet Take 1 tablet (800 mg) by mouth 3 times a day as needed for mild pain (1 - 3) (pain). 90 tablet 3    metFORMIN (Glucophage) 500 mg tablet Take 1 tablet (500 mg) by mouth 2 times daily (morning and late afternoon). 180 tablet 3    sertraline (Zoloft) 50 mg tablet Take 1 tablet (50 mg) by mouth once daily. 90 tablet 3     No current facility-administered medications on file prior to visit.        Allergies:   Allergies   Allergen  "Reactions    Penicillin G Hives         Assessment/Plan   Assessment:   Ms. Sheron Ortiz is a 28-year-old woman with a history of depression, anxiety, PTSD, psychogenic nonepileptic seizures. Follow up done virtually today.      1/3/25 - Reports improved mood and anxiety on current regimen (sertraline 50mg/day and buspirone 10mg/day). 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   Also reports \"night frights.\"   Has an emotional support animal, which has been very helpful.    Smokes marijuana daily.      Diagnosis:   Anxiety disorder (mixed generalized anxiety and panic disorder)  Psychogenic nonepileptic seizures  PTSD  Mood disorder - does not meet criteria for bipolar type I or II but does report brief periods of mood upswings.   R/O Mood disorder w/ psychosis vs schizoaffective disorder  ?OCD    Treatment Plan/Recommendations:   - Trial prazosin for nightmares. Discussed side effects and provided education on orthostatic hypotension.   - Continue sertraline 50mg daily.   - Continue buspirone 10mg twice daily.   - Supportive therapy provided.   - Continue psychotherapy.   - Continue to monitor for possible psychotic symptoms - does not appear to be psychotic; may be more related to PTSD.   - Follow up in 3 months.     Review with patient: Treatment plan reviewed with the patient.  Medication risks/benefit reviewed with the patient    Jasmina Patel MD  "

## 2025-01-03 NOTE — PATIENT INSTRUCTIONS
Plan:   - Start prazosin 1mg at bedtime. Monitor for any side effects, including low blood pressure. Make sure you are well hydrated; change position from lying down or sitting to standing gradually.   - Continue sertraline 50mg daily.   - Continue buspirone 10mg twice daily.   - Continue psychotherapy.   - Follow up in 3 months.   - Contact via [facility] or call sooner ([phone]) in case of any questions or concerns.  - Call [phone] for mental health crisis or suicidal thoughts, or call 911 or go to the nearest emergency room for emergencies.

## 2025-03-13 ENCOUNTER — PATIENT MESSAGE (OUTPATIENT)
Dept: BEHAVIORAL HEALTH | Facility: CLINIC | Age: 30
End: 2025-03-13
Payer: MEDICAID

## 2025-03-13 DIAGNOSIS — F32.89 OTHER DEPRESSION: ICD-10-CM

## 2025-03-13 RX ORDER — SERTRALINE HYDROCHLORIDE 50 MG/1
50 TABLET, FILM COATED ORAL DAILY
Qty: 90 TABLET | Refills: 1 | Status: SHIPPED | OUTPATIENT
Start: 2025-03-13 | End: 2026-03-13

## 2025-03-13 RX ORDER — BUSPIRONE HYDROCHLORIDE 10 MG/1
10 TABLET ORAL EVERY 12 HOURS
Qty: 60 TABLET | Refills: 2 | Status: SHIPPED | OUTPATIENT
Start: 2025-03-13

## 2025-03-19 ENCOUNTER — OFFICE VISIT (OUTPATIENT)
Dept: SURGERY | Facility: CLINIC | Age: 30
End: 2025-03-19
Payer: MEDICAID

## 2025-03-19 VITALS
SYSTOLIC BLOOD PRESSURE: 114 MMHG | BODY MASS INDEX: 44.22 KG/M2 | HEIGHT: 64 IN | HEART RATE: 73 BPM | WEIGHT: 259 LBS | TEMPERATURE: 97.9 F | DIASTOLIC BLOOD PRESSURE: 73 MMHG

## 2025-03-19 DIAGNOSIS — Z72.4 INAPPROPRIATE DIET AND EATING HABITS: ICD-10-CM

## 2025-03-19 DIAGNOSIS — Z72.3 INADEQUATE EXERCISE: ICD-10-CM

## 2025-03-19 DIAGNOSIS — R63.2 POLYPHAGIA: ICD-10-CM

## 2025-03-19 DIAGNOSIS — E66.01 CLASS 3 SEVERE OBESITY DUE TO EXCESS CALORIES WITH SERIOUS COMORBIDITY AND BODY MASS INDEX (BMI) OF 40.0 TO 44.9 IN ADULT: Primary | ICD-10-CM

## 2025-03-19 DIAGNOSIS — E66.813 CLASS 3 SEVERE OBESITY DUE TO EXCESS CALORIES WITH SERIOUS COMORBIDITY AND BODY MASS INDEX (BMI) OF 40.0 TO 44.9 IN ADULT: Primary | ICD-10-CM

## 2025-03-19 DIAGNOSIS — E63.9 INADEQUATE CALORIC INTAKE: ICD-10-CM

## 2025-03-19 PROCEDURE — 3008F BODY MASS INDEX DOCD: CPT

## 2025-03-19 PROCEDURE — 99215 OFFICE O/P EST HI 40 MIN: CPT

## 2025-03-19 PROCEDURE — 99205 OFFICE O/P NEW HI 60 MIN: CPT

## 2025-03-19 PROCEDURE — 1036F TOBACCO NON-USER: CPT

## 2025-03-19 RX ORDER — NORETHINDRONE ACETATE AND ETHINYL ESTRADIOL AND FERROUS FUMARATE 1MG-20(21)
1 KIT ORAL DAILY
COMMUNITY

## 2025-03-19 ASSESSMENT — PAIN SCALES - GENERAL: PAINLEVEL_OUTOF10: 0-NO PAIN
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
## 2025-03-19 NOTE — PATIENT INSTRUCTIONS
Check insurance to see if any of the following medications are covered and we will consider appropriate addition of possible medications to assist with weight management at followup:   1. Phentermine  2. Topiramate  3. Qsymia  4. Wellbutrin  5. Contrave  6. Saxenda  7. Wegovy/Semaglutide   8. Zepbound/Tirzepatide   9. Trulicity     *Please schedule with outpatient dietitian to optimize the dietary recommendations below to your individual food preferences and dietary patterns by calling [phone]*    *If lab work ordered for you today - complete prior to next visit - labs are fasting*    A few key concepts for weight management:  1. Calorie control is necessary for weight loss  2. Protein prioritization helps control appetite and helps to maintain lean/muscle body mass with weight loss  3. Fiber rich foods ensure balanced blood sugar and help you stay full, also high nutrient foods  4. Consistency is critical for long term success  5. Successful weight loss requires trade offs - we acknowledge that meal planning and some element of preparation for most days of the week is necessary for success  6. Set an environment for success - keep binge-worthy processed food products out of the home as much as possible  7. Tracking some component of your nutrition intake (whether it's calories, macros, carbs, meal structure, etc) is a tool to help you stay consistent and accountable  8. Regular physical activity is critical for weight loss maintenance. Exercise helps us maintain lean body mass (muscle mass) which helps to maintain a higher metabolic rate (how many calories your burn daily). Without maintaining lean mass/muscle mass, weight loss is more difficult to sustain long term.     Detailed Diet Recommendations:  Self monitoring through some metric can be a tool to help with weight loss. Measuring portions can be one way to monitor your nutrition intake to support health improvement.   - Some apps you can use are  "Myfitnesspal, Lose It, Carb Manager, Cronometer, etc. Self monitoring is an extremely useful tool however please recognize that external tracking devices for activity and calories are not perfect, underestimating of total calorie intake is common, as well as overestimation through activity trackers of total calorie expenditure.     Please limit processed foods in the diet as they are known to contribute to obesity and weight gain. Processed foods include food items like bakery, chips, snack crackers, cereals, cookies, some pasta/breads, etc. These foods will typically come from \"bag, boxes with barcodes\" from the middle aisles of the grocery store - they contain added sugars and refined flours, are low in fiber and may stimulate appetite rather than help manage it effectively.     Increasing your intake of HIGH FIBER carbohydrates sources to increase fullness and appetite regulation with meals and between meals. High fiber foods are vegetables, fruit, beans, lentils, nuts/seeds, etc.      WHAT WILL MY PLATE LOOK LIKE IF I EAT LIKE THIS?.    - Aim for MINIMUM 25-30g of protein at meals: approx 4 oz of high protein food like chicken, fish, turkey, beef, pork, 2-3 eggs, 1/2-3/4 cup cottage cheese or plain greek yogurt. At each meal EAT PROTEIN FIRST! This sets the stage to help better regulate your appetite during and between meals. Think \"Palm of your hand portion of protein\" at each meal.     - The remainder of the plate is optimized to increase quality of the diet by including plenty of vegetables, appropriate fruit or starch portions: Aim for \"2 fist sized portions of fruits and vegetables with each meal\". A \"fist sized\" portion is about 1 cup.   - Choose a variety of vegetables, fruits and whole grains - aim to eat a wide variety of colors to improve quality of diet.     Fat included with meals is best in small portions - Fat is calorie dense so it is important to monitor the portion, to avoid excess calories. " Keep servings to the followin-2 tbsp olive/avocado/coconut oil (can be used to cook or dress vegetables), 1-2 tbsp unsweetened nut butter (peanut, almond, etc), 5-6 olives, 1/3 avocado, 1/2-1 oz nuts/seeds likes walnuts, almonds, pecans, brazil nuts, etc. This would include if you are cooking your meal with oil/butter/etc.     Please avoid liquid sources of calories as we often do not think about their contribution to our total daily calorie intake nor do they help regulate appetite when working towards weight loss. Hydration is important, aim for at least 64 oz zero calorie beverages daily, ideally water.     Exercise Recommendations:   Continue regular exercise regimen - you are following an appropriate program of 4x/week of resistance training & aerobic training for 60-90 minutes per session, achieving > than 200 minute per week goal.    Web based resources for meal planning:  www.Spongecell.Periscape - This is a website authored by registered dietitians, has many great resources for healthy nutrition.     Follow up: 4-6 weeks    If appointment was not scheduled before leaving today please call [phone] to speak with an .    Follow up visits are typically VIRTUAL  - Please have a reliable scale to weigh yourself at home for follow up visits  - Please have a blood pressure cuff to monitor blood pressure & heart rate at home (can be purchased over the counter, on Amazon, drug store, etc).   - For virtual visits you must be in the State of [place]  - YOU CANNOT BE DRIVING, please remember this is an appointment and should be treated the same as if you were in the office for follow up appointment.

## 2025-03-31 ENCOUNTER — APPOINTMENT (OUTPATIENT)
Dept: BEHAVIORAL HEALTH | Facility: CLINIC | Age: 30
End: 2025-03-31
Payer: MEDICAID

## 2025-03-31 DIAGNOSIS — F32.89 OTHER DEPRESSION: ICD-10-CM

## 2025-03-31 DIAGNOSIS — F44.5 PSYCHOGENIC NONEPILEPTIC SEIZURE: ICD-10-CM

## 2025-03-31 DIAGNOSIS — F41.8 OTHER SPECIFIED ANXIETY DISORDERS: ICD-10-CM

## 2025-03-31 PROCEDURE — 90837 PSYTX W PT 60 MINUTES: CPT | Performed by: PSYCHOLOGIST

## 2025-04-03 NOTE — PROGRESS NOTES
"Verbal consent was requested and obtained from patient on this date for a telehealth visit.  The telehealth session connected with the patient at her home.    Sheron is a 29-year-old woman with PNES referred by Dr. Patel who returns for cognitive behavioral psychotherapy after an extended absence. She has been experiencing episodes since .  She reported that these episodes can occur  as frequently as 2-3 times daily. Triggers seem to be stress, anxiety, driving at night and exposure to bright lights. She has described the episodes as whole body tremors with fists clenching, sometimes leading to, or consequence of, panic. She recovers by sleeping.     We have resumed cognitive behavioral effort to work on exposure to anxiety-provoking stimuli and an exposure plus response prevention model for dealing with agoraphobia from a cognitive behavioral perspective.    Our session addressed several issues, includin.  Our goals for her include:  A.  Address agoraphobia and her strategies of fighting through it with walking and taking trips out of state  B.  Pursue grief counseling following the death of her stepmother last year.    C.  Maintain medications as prescribed by Dr. Patel  D.  Continue to see a therapist every other week at [facility] to review everyday life, anger, repeated regression and OCD.  E.  Self monitor episodes.     2.  She has been maintained on a regimen of sertraline but recently was off that medication for 1 week and reports that during that week she felt very emotionally labile, was easily triggered, lost her patient is easily and felt as though she was on a \"roller coaster\" of emotions.  She was not coping well and felt hypersensitive with significantly increased anxiety.  She has now been back on the medication for about a week and both mood and anxiety have come back to baseline.    3.  She has gotten an emotional support animal which she finds helpful.  Service " dog training is beginning next week.    4.  Her episodes have been less frequent the most recent 2-1/2 weeks ago when she was at a friend's birthday dinner.  We discussed stimulating for that episode and ways of coping in similar circumstances going forward.  She continues in counseling every 2 weeks and finds that helpful.  She has been anxious recently about an impending Social Security review.    5.  She reports that she gained 100 pounds when she was treated with lithium as a 16-year-old and now is considering bariatric surgery.  She has tried a variety of diets including intermittent fasting and exercise but has struggled still to lose that weight.    6.  Some psychotherapy content is not included in this note to protect patient confidentiality.    7.  She will contact our office to schedule a follow-up appointment.

## 2025-04-10 ENCOUNTER — TELEPHONE (OUTPATIENT)
Dept: SURGERY | Facility: CLINIC | Age: 30
End: 2025-04-10
Payer: MEDICAID

## 2025-04-10 NOTE — TELEPHONE ENCOUNTER
Attempted to reach the patient in regards to getting a sooner appt with Dr. Alfaro. I called [facility] stating the above I left the office number.   
Statement Selected

## 2025-04-18 ENCOUNTER — APPOINTMENT (OUTPATIENT)
Dept: SURGERY | Facility: CLINIC | Age: 30
End: 2025-04-18
Payer: MEDICAID

## 2025-04-23 ENCOUNTER — TELEPHONE (OUTPATIENT)
Dept: SURGERY | Facility: CLINIC | Age: 30
End: 2025-04-23

## 2025-04-23 ENCOUNTER — APPOINTMENT (OUTPATIENT)
Dept: SURGERY | Facility: CLINIC | Age: 30
End: 2025-04-23
Payer: MEDICAID

## 2025-04-23 VITALS — HEIGHT: 64 IN | WEIGHT: 248 LBS | BODY MASS INDEX: 42.34 KG/M2

## 2025-04-23 NOTE — PROGRESS NOTES
"Initial Bariatric Nutrition Assessment    Surgeon:   Dominic  Patient is considering: Sleeve gastrectomy/RNYGB    ASSESSMENT:  Current weight:   Vitals:    04/23/25 1304   Weight: 112 kg (248 lb)     Ht:  1.626 m (5' 4\")   BMI:  Body mass index is 42.57 kg/m².        Initial start weight:   259lbs  Pre-Op Excess Body Weight (EBW):   114lbs         This is a 29 y.o. female with morbid obesity (Body mass index is 42.57 kg/m².) who presents to clinic for consideration of bariatric surgery. she has attempted and failed multiple diet and exercise regimens for weight loss.   Initial Onset of obesity was at the age of 17 after starting Lithium.   Their goal for surgery is to   increase QOL . The patient has tried multiple diets to lose weight including  IF, keto, exercise . The patient was most successful with the  IF . The most pounds lost on this diet were 30 lbs. The patient considers their dietary weakness to be  night time snacking and portion size.  The patient reports a  highest weight ever of 270 pounds and lowest weight ever of 135 pounds. Has been working with [facility] [employer] and has lost about 11lbs by eliminating fast food and decreased her carb intake.     Current diet: low carb, no fast food   The patient gets movement with her nieces and 1 year old dog.     Food allergies/intolerances: dairy (milk and cream) and soy and salmon   Chewing/Swallowing/Dentition: no  Nausea / Vomiting / Hx Gastroparesis:  no  Diarrhea/ Constipation: no  Smoking/Tobacco use: no  Vitamins/Minerals supplements: no  Hours of sleep/night: 6-8 hours     Medications:   Current Medications[1]      24 HOUR RECALL/DIET HISTORY:  Breakfast:  skip   Snack:    Lunch: taco bowl (ground turkey corn and peppers) and side of broccoli  Snack: nutrigrain bar or special K 100kcals   Dinner: ground turkey with 1.5 cup of stuffing and green beans   Snack:   Beverages: Starbucks/Ginny Coffee w/cream and sugar, water (120oz), diet soda (1x per day), CL, polar pop " (1x per day)  Alcohol: 2x per month     Person responsible for cooking & shopping?   Self   How often do you eat sweet snacks?   QD  How often do you eat savory snacks?  Rarely   How often do you eat out?   1-2x per month   Do you feel overly stuffed? yes  Binge Eating?  No   Night Eating?  No   Emotional Eating?  yes       READINESS TO LEARN:  Motivation to learn: Interested        Understanding of instruction: Good     Anticipated Compliance: Good        Family Support: yes           Educational Materials Provided:    Schedules for MSWL class and support group   1400  Calorie meal plan   Goals sheet    Nutrition assessment completed today.  Pt will be scheduled for video education class to discuss the 2 week pre op diet, post op protein and fluid goals, vitamin and mineral supplementation, exercise goals, and post op diet progression closer to the time of surgery    Patient is seeking Sleeve gastrectomy/RNYGB    Instructed pt on a 1400 calorie meal plan and to measure and record intake daily. Advised to eat 3 meals per and 1-2 high protein snacks.  Recommend eating 3-4 oz per meal. Reviewed the postop behaviors to start practicing.  Set a goal to start doing exercise of choices for 3x per week.     Patient was receptive to nutritional recommendations, asked numerous questions, and verbalized understanding of the weight loss surgery diet.  Patient expressed understanding about the importance of strict dietary compliance post-surgery to avoid nutritional deficiencies and achieve optimal weight loss and verbalized intent to follow dietary recommendations.    Malnutrition Screening:   Significant unintentional weight loss? n/a   Eating less than 75% of usual intake for more than 2 weeks? n/a      Nutrition Diagnosis:   Overweight/obesity related to excess energy intake as evidenced by BMI >= 40 kg/m^2.  Food- and nutrition-related knowledge deficit related to lack of prior exposure to surgical weight loss information  as evidenced by pt new to surgical program.    Nutrition Interventions:   Modify type and amount of food and nutrients within meals and snacks.  Comprehensive Nutrition Education    Recommendations:  1. Begin following your meal plan.  Measure and record intake daily.   2. Structure meal patterns, eating three meals and 1-2 snacks per day.  3. Aim for 3-4 oz (20g) protein per meal.  Have 1-2 high protein snacks that are 10-20 g protein each.  You can try a tuna or chicken packet, Greek yogurt, 2 string cheeses, Protein bars like Quest, Pure Protein, Premier, or Built Bars. you can also try protein chips form Quest or Atkins.    4. Drink 64oz of calorie-free, caffeine-free, and non-carbonated beverages. Practice taking sips and avoid straws.   5. Practice no drinking 30 minutes before meals, nothing with meals and wait 30 minutes after meals to drink again. Make meals last 30 minutes-chew thoroughly.   6. Limit or omit eating out/sweets/savory snacks to 1-2 times per week.  7. Begin daily multivitamin.  Flintstones Complete has everything you need.  8. Begin alex exercises 3x per week. Increase physical activity by 10-15 minutes as tolerated to an end goal of 60 minutes 5 x per week. Consistency is the key.  9. Identify emotional/boredom eating. Only have a snack if you have physical hunger cues.       Pre-op Goal weight: lose 5% of body weight    Nutrition Monitoring and Evaluation: 1-2 pound weight loss per week  Criteria: weight check  Need for Follow-up: one month     Patient does meet National Institutes Health guidelines for weight loss surgery, however needs to demonstrate consistent effort in making dietary changes before giving clearance. It is anticipated that the patient will need at least 1-2 nutritional follow-up visits prior to clearance for surgery.      Mahnaz Anderson MS, RD, LD  Phone: [phone]           [1]   Current Outpatient Medications:     albuterol (Ventolin HFA) 90 mcg/actuation inhaler,  Inhale 2 puffs every 4 hours if needed for wheezing or shortness of breath., Disp: 18 g, Rfl: 3    albuterol 2.5 mg /3 mL (0.083 %) nebulizer solution, Take 3 mL (2.5 mg) by nebulization every 4 hours if needed for wheezing or shortness of breath., Disp: 75 mL, Rfl: 3    busPIRone (Buspar) 10 mg tablet, Take 1 tablet (10 mg) by mouth every 12 hours., Disp: 60 tablet, Rfl: 2    cetirizine-pseudoephedrine (ZyrTEC-D) 5-120 mg 12 hr tablet, Take 1 tablet by mouth 2 times a day as needed for allergies., Disp: 14 tablet, Rfl: 0    fluticasone (Flonase) 50 mcg/actuation nasal spray, Administer 1 spray into each nostril once daily. Shake gently. Before first use, prime pump. After use, clean tip and replace cap., Disp: 16 g, Rfl: 0    ibuprofen 800 mg tablet, Take 1 tablet (800 mg) by mouth 3 times a day as needed for mild pain (1 - 3) (pain)., Disp: 90 tablet, Rfl: 3    norethindrone-e.estradioL-iron (Junel FE 1/20, 28,) 1 mg-20 mcg (21)/75 mg (7) tablet, Take 1 tablet by mouth once daily., Disp: , Rfl:     prazosin (Minipress) 1 mg capsule, Take 1 capsule (1 mg) by mouth once daily at bedtime., Disp: 30 capsule, Rfl: 3    sertraline (Zoloft) 50 mg tablet, Take 1 tablet (50 mg) by mouth once daily., Disp: 90 tablet, Rfl: 1     EDIN HDZ

## 2025-04-23 NOTE — TELEPHONE ENCOUNTER
Thank you for speaking with me earlier today & confirming your scheduled visit with Dr. Alfaro on 4/30/25 at 3:30 pm at [facility] (inside [facility], main floor in the [facility]). Please arrive by 11:45 am to attend the New Patient Class.  Parking is available at a cost of $5.00 at the Main Entrance.  We look forward to seeing you!  Alyssa Walters RN, Clinic Nurse

## 2025-04-25 ENCOUNTER — APPOINTMENT (OUTPATIENT)
Dept: BEHAVIORAL HEALTH | Facility: CLINIC | Age: 30
End: 2025-04-25
Payer: MEDICAID

## 2025-04-25 DIAGNOSIS — F32.89 OTHER DEPRESSION: ICD-10-CM

## 2025-04-25 DIAGNOSIS — F41.8 OTHER SPECIFIED ANXIETY DISORDERS: ICD-10-CM

## 2025-04-25 DIAGNOSIS — F44.5 PSYCHOGENIC NONEPILEPTIC SEIZURE: ICD-10-CM

## 2025-04-25 DIAGNOSIS — F43.10 PTSD (POST-TRAUMATIC STRESS DISORDER): ICD-10-CM

## 2025-04-25 PROCEDURE — 99213 OFFICE O/P EST LOW 20 MIN: CPT | Performed by: PSYCHIATRY & NEUROLOGY

## 2025-04-25 RX ORDER — PRAZOSIN HYDROCHLORIDE 1 MG/1
1 CAPSULE ORAL NIGHTLY
Qty: 30 CAPSULE | Refills: 3 | Status: SHIPPED | OUTPATIENT
Start: 2025-04-25 | End: 2026-04-25

## 2025-04-25 NOTE — PROGRESS NOTES
"Outpatient Psychiatry Follow up visit    Ms. Sheron Ortiz is a 29-year-old woman with a history of depression, anxiety, PTSD, psychogenic nonepileptic seizures. Follow up done virtually today. Not able to join video it.     Virtual or Telephone Consent    While technically available, the patient was unable or unwilling to consent to connect via audio/video telehealth technology; therefore, I performed this visit using a real-time audio only connection between Sheron Mcmullen & Jasmina Patel MD.  Verbal consent was requested and obtained from Sheron Arpitawijimmie on this date, 04/25/25 for a telehealth visit and the patient's location was confirmed at the time of the visit.    Subjective:      She says she has been \"pretty good.\"    She says she was out of her medication for a month about a month back and had a \"bad week\" but has not had any significant issues since being back on her medications.   She says she has been a little more anxious because she is working on bariatric program for weight loss. She says she does not want to bansal the process too soon.     She says that last week, she woke up with a panic attack. She is not sure if she was having a dream that triggered it. She says that otherwise, most of the time, she sleeps well through the night. She says starting prazosin has helped decrease her \"night terrors.\"     She says that she had a \"seizures\" March 14th, which lasted about 90 seconds; she says she was in a crowded restaurant and knows that anxiety was a trigger. She says there was another one last week which was very brief and was also triggered by anxiety/panic symptoms. She says she gets a \"shooting pain\" in her head, then eyes will get blurry, and then has to lie down; she says people tell her she loses consciousness. She says that she is able to recover more quickly from these episodes.      Sleep - \"better.\"   Appetite is okay.   She says she has had thoughts of self-harm but " "has not done anything. She says she has been struggling a little more with this the past month but uses coping skills.   She denies suicidal thoughts, intent or plans.      She says she has not had any manic episodes recently. Denies any sustained periods of decreased need for sleep, increased activity.      PTSD - says night terrors have decreased significantly since starting prazosin.     She says she has heard \"3 voices\" \"all day every day\" since she was 9. She says this is at baseline and has not had any increase or new. When she is comfortable, the voices are \"quiet.\"     She denies any panic attacks.      She says she has an emotional support dog; her dog is doing service dog training.     She also sees a therapist Caio Malagon at [facility]. She says they are working on stress management.      She is on Metformin - helps with PCOS.     Stopped smoking about 1 year ago.   Does not drink alcohol   Says she smokes marijuana every day for her stress, anxiety.      Current medications:   Buspirone 10mg twice daily  Sertraline 50mg daily  Prazosin 1mg at bedtime    Past medications:   Prazosin 1mg at bedtime (not taken since September 2023)  Lithium - caused stomach problems, weight gain  Vistaril   Vyvanse  Melatonin  Abilify - weight gain     Psychiatric Review Of Systems:     As above.    Medical Review Of Systems:    Pertinent items are noted in HPI.    Record Review: brief     Mental Status Evaluation:  Behavior/Attitude: Cooperative. Pleasant.   Motor: Psychomotor activity in average range. No abnormal involuntary movements.   Speech: Regular in rate, tone and volume. No pressure.  Mood: \"pretty good.\"  Thought process: Goal-directed. Linear. Organized.  Thought content: No paranoia, delusion or ideas of reference elicited.  Reports chronic AH - at baseline.   Suicidal thoughts: reports intermittent self-harming thoughts but denies any self-harming behaviors or any suicidal ideation, intent or " plans.   Homicidal ideation: denied.   Insight: Fair.  Judgment: Fair.  Recent and remote memory: intact based on recall of recent and remote autobiographical memories.  Attention/concentration: intact.  Language: No aphasia or paraphasic errors.   Fund of knowledge: Average    PMH/PSH:  Past Medical History:   Diagnosis Date    Acute respiratory distress     Respiratory distress    Allergy status to unspecified drugs, medicaments and biological substances     History of seasonal allergies    Depression, unspecified 12/21/2013    Depression    Other specified respiratory disorders     Viral respiratory infection    Personal history of other specified conditions     History of chest pain    Personal history of other specified conditions     History of wheezing    Personal history of pneumonia (recurrent)     History of pneumonia    Unspecified asthma, uncomplicated ([facility])     RAD (reactive airway disease)        Meds  Current Outpatient Medications on File Prior to Visit   Medication Sig Dispense Refill    albuterol (Ventolin HFA) 90 mcg/actuation inhaler Inhale 2 puffs every 4 hours if needed for wheezing or shortness of breath. 18 g 3    albuterol 2.5 mg /3 mL (0.083 %) nebulizer solution Take 3 mL (2.5 mg) by nebulization every 4 hours if needed for wheezing or shortness of breath. 75 mL 3    busPIRone (Buspar) 10 mg tablet Take 1 tablet (10 mg) by mouth every 12 hours. 60 tablet 2    cetirizine-pseudoephedrine (ZyrTEC-D) 5-120 mg 12 hr tablet Take 1 tablet by mouth 2 times a day as needed for allergies. 14 tablet 0    fluticasone (Flonase) 50 mcg/actuation nasal spray Administer 1 spray into each nostril once daily. Shake gently. Before first use, prime pump. After use, clean tip and replace cap. 16 g 0    ibuprofen 800 mg tablet Take 1 tablet (800 mg) by mouth 3 times a day as needed for mild pain (1 - 3) (pain). 90 tablet 3    norethindrone-e.estradioL-iron (Junel FE 1/20, 28,) 1 mg-20 mcg (21)/75 mg (7)  tablet Take 1 tablet by mouth once daily.      prazosin (Minipress) 1 mg capsule Take 1 capsule (1 mg) by mouth once daily at bedtime. 30 capsule 3    sertraline (Zoloft) 50 mg tablet Take 1 tablet (50 mg) by mouth once daily. 90 tablet 1     No current facility-administered medications on file prior to visit.        Allergies:   Allergies   Allergen Reactions    Metformin Other     Abd.pain/cramps, multiple episodes of diarrhea per day     Animal Dander Swelling    Cat Dander Swelling    Dog Dander Swelling    Milk Swelling    Penicillin G Hives    Pollen Extracts Swelling    Soy Diarrhea    Soybean Hives     Assessment/Plan   Assessment:     Ms. Sheron Ortiz is a 29-year-old woman with a history of depression, anxiety, PTSD, psychogenic nonepileptic seizures. Follow up done virtually (audio only) today.      4/25/25 - Reports improved mood and anxiety on current regimen (sertraline 50mg/day and buspirone 10mg/day), and improved sleep and decreased night terrors on prazosin.   Reports ongoing PNES episodes but brief.    Considering bariatric surgery.   Has an emotional support animal, which has been very helpful.    Smokes marijuana daily.      Diagnosis:   Anxiety disorder (mixed generalized anxiety and panic disorder)  Psychogenic nonepileptic seizures  PTSD  Mood disorder - does not meet criteria for bipolar type I or II but does report brief periods of mood upswings.   R/O Mood disorder w/ psychosis vs schizoaffective disorder  ?OCD    Treatment Plan/Recommendations:   - Continue prazosin 1mg at bedtime.   - Continue sertraline 50mg daily.   - Continue buspirone 10mg twice daily.   - Supportive therapy provided.   - Continue psychotherapy.   - Continue to monitor for possible psychotic symptoms - does not appear to be psychotic; likely related to PTSD.   - Follow up in 3 months.     Review with patient: Treatment plan reviewed with the patient.  Medication risks/benefit reviewed with the  patient    Time spent on phone with patient: 18 minutes.     Jasmina Patel MD

## 2025-04-25 NOTE — PATIENT INSTRUCTIONS
Plan:   - Continue prazosin 1mg at bedtime.   - Continue sertraline 50mg daily.   - Continue buspirone 10mg twice daily.   - Continue psychotherapy.   - Follow up in 3 months. Call [phone] to schedule.   - Contact via [employer] or call sooner ([phone]) in case of any questions or concerns.  - Call [phone] for mental health crisis or suicidal thoughts, or call 911 or go to the nearest emergency room for emergencies.

## 2025-04-28 ENCOUNTER — APPOINTMENT (OUTPATIENT)
Dept: BEHAVIORAL HEALTH | Facility: CLINIC | Age: 30
End: 2025-04-28
Payer: MEDICAID

## 2025-04-30 ENCOUNTER — DOCUMENTATION (OUTPATIENT)
Dept: SURGERY | Facility: CLINIC | Age: 30
End: 2025-04-30

## 2025-04-30 ENCOUNTER — APPOINTMENT (OUTPATIENT)
Dept: SURGERY | Facility: CLINIC | Age: 30
End: 2025-04-30
Payer: MEDICAID

## 2025-04-30 VITALS
BODY MASS INDEX: 40.34 KG/M2 | HEART RATE: 74 BPM | OXYGEN SATURATION: 96 % | SYSTOLIC BLOOD PRESSURE: 120 MMHG | WEIGHT: 236.3 LBS | DIASTOLIC BLOOD PRESSURE: 75 MMHG | HEIGHT: 64 IN

## 2025-04-30 DIAGNOSIS — F39 MOOD DISORDER (CMS-HCC): ICD-10-CM

## 2025-04-30 DIAGNOSIS — J45.20 MILD INTERMITTENT REACTIVE AIRWAY DISEASE WITHOUT COMPLICATION (HHS-HCC): ICD-10-CM

## 2025-04-30 DIAGNOSIS — Z13.21 ENCOUNTER FOR VITAMIN DEFICIENCY SCREENING: ICD-10-CM

## 2025-04-30 DIAGNOSIS — Z98.84 BARIATRIC SURGERY STATUS: ICD-10-CM

## 2025-04-30 DIAGNOSIS — Z98.84 STATUS POST BARIATRIC SURGERY: ICD-10-CM

## 2025-04-30 DIAGNOSIS — F44.5 PSYCHOGENIC NONEPILEPTIC SEIZURE: ICD-10-CM

## 2025-04-30 DIAGNOSIS — E66.813 CLASS 3 SEVERE OBESITY DUE TO EXCESS CALORIES WITH SERIOUS COMORBIDITY AND BODY MASS INDEX (BMI) OF 40.0 TO 44.9 IN ADULT: Primary | ICD-10-CM

## 2025-04-30 DIAGNOSIS — Z01.818 PREOPERATIVE CLEARANCE: ICD-10-CM

## 2025-04-30 DIAGNOSIS — E66.01 MORBID OBESITY (MULTI): ICD-10-CM

## 2025-04-30 PROBLEM — F11.21 HISTORY OF NARCOTIC ADDICTION (MULTI): Status: ACTIVE | Noted: 2025-04-30

## 2025-04-30 PROCEDURE — 3008F BODY MASS INDEX DOCD: CPT | Performed by: SURGERY

## 2025-04-30 PROCEDURE — 1036F TOBACCO NON-USER: CPT | Performed by: SURGERY

## 2025-04-30 PROCEDURE — 99204 OFFICE O/P NEW MOD 45 MIN: CPT | Performed by: SURGERY

## 2025-04-30 NOTE — ASSESSMENT & PLAN NOTE
Had a psychotic break when she was 16 years old. Her last hospitalization was 7 years ago. She has been very stable with her psychiatrist and psychologist and therapist.

## 2025-04-30 NOTE — ASSESSMENT & PLAN NOTE
Short term, after surgery, when she was a teenager. She prefers non-narcotic pain management after surgery.

## 2025-04-30 NOTE — PROGRESS NOTES
This RN spoke with patient and patient's mother, patient's mother described that she wanted to become patient's POA due to patient having anxiety again. RN educated both patient and mother that we cannot do verbal consent, the appropriate documentation has to be filled out. Patient and patient's mother verbalized understanding and stated that they will fill out documents. Documents handed to patient in office.
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
## 2025-04-30 NOTE — PATIENT INSTRUCTIONS
Thank you for choosing [facility]. Please get the below lab work done at any  lab facility. You can make an appointment through [employer].   Here is a list of all the lab work that was ordered for you today while seeing the provider:     CMC and Auto Differential   Comprehensive Metabolic Panel  Hemoglobin A1c  Zinc  Vitamin D 25  Vitamin B12  Vitamin B1  Vitamin A  Thyroxine free  Thyroid Secreting Hormone  Parathyroid secreting Hormone  Nicotine  Lipid Panel  Iron and TIBC  Folate  Ferritin  Urine Drug Screen   C-Peptide  Copper  Coagulation screen    If there are any questions or concerns please feel free to reach out to the [facility] Bariatric Office.

## 2025-05-05 ENCOUNTER — TELEPHONE (OUTPATIENT)
Dept: SLEEP MEDICINE | Facility: CLINIC | Age: 30
End: 2025-05-05
Payer: MEDICAID

## 2025-05-11 LAB
25(OH)D3+25(OH)D2 SERPL-MCNC: 19 NG/ML (ref 30–100)
ALBUMIN SERPL-MCNC: 4.1 G/DL (ref 3.6–5.1)
ALP SERPL-CCNC: 71 U/L (ref 31–125)
ALT SERPL-CCNC: 18 U/L (ref 6–29)
ANION GAP SERPL CALCULATED.4IONS-SCNC: 10 MMOL/L (CALC) (ref 7–17)
APTT PPP: 33 SEC (ref 23–32)
AST SERPL-CCNC: 14 U/L (ref 10–30)
BASOPHILS # BLD AUTO: 41 CELLS/UL (ref 0–200)
BASOPHILS NFR BLD AUTO: 0.5 %
BILIRUB SERPL-MCNC: 0.3 MG/DL (ref 0.2–1.2)
BUN SERPL-MCNC: 12 MG/DL (ref 7–25)
C PEPTIDE SERPL-MCNC: 4.1 NG/ML (ref 0.8–3.85)
CALCIUM SERPL-MCNC: 8.9 MG/DL (ref 8.6–10.2)
CHLORIDE SERPL-SCNC: 107 MMOL/L (ref 98–110)
CHOLEST SERPL-MCNC: 183 MG/DL
CHOLEST/HDLC SERPL: 4.3 (CALC)
CO2 SERPL-SCNC: 23 MMOL/L (ref 20–32)
COPPER BLD-MCNC: NORMAL UG/DL
COTININE SERPL-MCNC: NORMAL NG/ML
CREAT SERPL-MCNC: 0.55 MG/DL (ref 0.5–0.96)
EGFRCR SERPLBLD CKD-EPI 2021: 128 ML/MIN/1.73M2
EOSINOPHIL # BLD AUTO: 312 CELLS/UL (ref 15–500)
EOSINOPHIL NFR BLD AUTO: 3.8 %
ERYTHROCYTE [DISTWIDTH] IN BLOOD BY AUTOMATED COUNT: 14.1 % (ref 11–15)
EST. AVERAGE GLUCOSE BLD GHB EST-MCNC: 111 MG/DL
EST. AVERAGE GLUCOSE BLD GHB EST-SCNC: 6.2 MMOL/L
FERRITIN SERPL-MCNC: 18 NG/ML (ref 16–154)
FOLATE SERPL-MCNC: 3.9 NG/ML
GLUCOSE SERPL-MCNC: 89 MG/DL (ref 65–99)
HBA1C MFR BLD: 5.5 %
HCT VFR BLD AUTO: 41.5 % (ref 35–45)
HDLC SERPL-MCNC: 43 MG/DL
HGB BLD-MCNC: 13.4 G/DL (ref 11.7–15.5)
INR PPP: 0.9
IRON SATN MFR SERPL: 17 % (CALC) (ref 16–45)
IRON SERPL-MCNC: 56 MCG/DL (ref 40–190)
LDLC SERPL CALC-MCNC: 103 MG/DL (CALC)
LYMPHOCYTES # BLD AUTO: 2747 CELLS/UL (ref 850–3900)
LYMPHOCYTES NFR BLD AUTO: 33.5 %
MCH RBC QN AUTO: 28.3 PG (ref 27–33)
MCHC RBC AUTO-ENTMCNC: 32.3 G/DL (ref 32–36)
MCV RBC AUTO: 87.7 FL (ref 80–100)
MONOCYTES # BLD AUTO: 558 CELLS/UL (ref 200–950)
MONOCYTES NFR BLD AUTO: 6.8 %
NEUTROPHILS # BLD AUTO: 4543 CELLS/UL (ref 1500–7800)
NEUTROPHILS NFR BLD AUTO: 55.4 %
NICOTINE SERPL-MCNC: NORMAL NG/ML
NONHDLC SERPL-MCNC: 140 MG/DL (CALC)
PLATELET # BLD AUTO: 215 THOUSAND/UL (ref 140–400)
PMV BLD REES-ECKER: 11.5 FL (ref 7.5–12.5)
POTASSIUM SERPL-SCNC: 4.2 MMOL/L (ref 3.5–5.3)
PROT SERPL-MCNC: 6.6 G/DL (ref 6.1–8.1)
PROTHROMBIN TIME: 10 SEC (ref 9–11.5)
PTH-INTACT SERPL-MCNC: 39 PG/ML (ref 16–77)
RBC # BLD AUTO: 4.73 MILLION/UL (ref 3.8–5.1)
SODIUM SERPL-SCNC: 140 MMOL/L (ref 135–146)
T4 FREE SERPL-MCNC: 0.9 NG/DL (ref 0.8–1.8)
TIBC SERPL-MCNC: 322 MCG/DL (CALC) (ref 250–450)
TRIGL SERPL-MCNC: 260 MG/DL
TSH SERPL-ACNC: 1.92 MIU/L
VIT A SERPL-MCNC: NORMAL UG/ML
VIT B1 BLD-SCNC: NORMAL NMOL/L
VIT B12 SERPL-MCNC: 363 PG/ML (ref 200–1100)
WBC # BLD AUTO: 8.2 THOUSAND/UL (ref 3.8–10.8)
ZINC SERPL-MCNC: NORMAL UG/ML

## 2025-05-12 ENCOUNTER — APPOINTMENT (OUTPATIENT)
Dept: SLEEP MEDICINE | Facility: CLINIC | Age: 30
End: 2025-05-12
Payer: MEDICAID

## 2025-05-12 DIAGNOSIS — Z98.84 BARIATRIC SURGERY STATUS: ICD-10-CM

## 2025-05-12 DIAGNOSIS — Z01.818 PRE-OPERATIVE CLEARANCE: ICD-10-CM

## 2025-05-12 DIAGNOSIS — F12.90 MARIJUANA USE, CONTINUOUS: ICD-10-CM

## 2025-05-12 DIAGNOSIS — F51.5 NIGHTMARE DISORDER: ICD-10-CM

## 2025-05-12 DIAGNOSIS — F32.A DEPRESSION, UNSPECIFIED DEPRESSION TYPE: ICD-10-CM

## 2025-05-12 DIAGNOSIS — E66.813 CLASS 3 SEVERE OBESITY DUE TO EXCESS CALORIES WITH SERIOUS COMORBIDITY AND BODY MASS INDEX (BMI) OF 40.0 TO 44.9 IN ADULT: Primary | ICD-10-CM

## 2025-05-12 PROCEDURE — 1036F TOBACCO NON-USER: CPT | Performed by: PSYCHIATRY & NEUROLOGY

## 2025-05-12 PROCEDURE — 99203 OFFICE O/P NEW LOW 30 MIN: CPT | Performed by: PSYCHIATRY & NEUROLOGY

## 2025-05-12 NOTE — PROGRESS NOTES
" Patient: Sheron Mcmullen    [id number]  : 1995 -- AGE 29 y.o.    Provider: Georges Upton MD     Location [facility]   Service Date: 2025              [facility]  New Visit Note      Virtual or Telephone Consent  An interactive audio and video telecommunication system which permits real time communications between the patient (at the originating site) and provider (at the distant site) was utilized to provide this telehealth service.   Verbal consent was requested and obtained from Sheron Mcmullen on this date, 25 for a telehealth visit and the patient's location was confirmed at the time of the visit.  I verified the patient's identity and physical location in [place].  If this is a new patient to me, I informed the patient of my name and type of active [place] license that I hold.      The patient's referring provider is: Roderick Alfaro MD    HPI:  Sheron Mcmullen is a 29 y.o. female with PMH notable for anxiety with agoraphobia, PNES, morbid obesity, PCOS, ADHD, vitamin D deficiency, and reactive airway disease, who presents today for bariatric surgery clearance.    No sleep complaints. No trouble falling or staying asleep. Wakes up feeling alert. No daytime sleepiness other than feeling run down after spending the day babysitting little children, so she takes naps for \"mental refresh\".         NIGHTTIME SYMPTOMS:   Snoring: never told that she does; boyfriend never told her she snores  Witnessed apnea: No  Nocturnal gasping: No  Nocturnal choking: No  Sleep walking: No  Sleep talking:  when younger she would sleep talk doing math in her sleep  Dream enactment: has kicked in her sleep in the remote past   Bruxism: yes, sometimes she wakes up with jaw clenched and jaw is grinding her teeth back and forth. Never had a mouth guard. Follows with dentist regularly.  Nocturnal excessive sweating: infrequent  Nightmares - controlled with " "prazosin  Nocturnal GERD: No  Morning headaches: No  Morning dry mouth/sore throat: occasional  Nocturia: No  Restless sleep: No  Sleep paralysis: No  Hypnagogic/hypnopompic hallucinations: No  Bedroom environment is conducive to sleep: Yes, but has to sleep with TV on, but it has a timer and goes off after 30 minutes    DAYTIME SYMPTOMS  Daytime sleepiness: as above  Feeling sleepy while driving: Denies    RLS symptoms: No   Cataplexy: No    SLEEP HABITS:   Preferred sleep position: side and prone  Bedtime: on weekdays 10 pm, plays video games, turns off her X-box around 11:45 pm, then scrolls through her phone for about 20 minutes, sleep latency immediate around midnight. Weekends bedtime around 1 am, asleep by 2 am  Wake time: 730 am on weekdays, 11 am to noon on weekends  Napping: around 1-2 pm some days for half an hour to 2 hours as described above. Napping is refreshing  Total estimated sleep per 24 hrs: 9-10 hours    PRIOR SLEEP STUDIES:  None    PRIOR TREATMENTS:  No sleep aids.    Problem List[1]  Medical History[2]  Surgical History[3]  Current Medications[4]  Allergies[5]    Family History[6]    SOCIAL HISTORY  Employment: unemployed due to mental health and seizure disorder; cares for her little siblings (3 and 4 yo)  Lives with: mom  Alcohol: one drink/week  Cigarettes: quit 2 years ago  Illicits: smokes marjiuana daily from anxiety, last consumption was 5/11/25 and plans to quit permanently   Caffeine: 1 Red Bull per day, will be cutting back     ROS: 12 point ROS positive for SOB with exertion, seizures (non-epileptic), weight loss (15 lbs recently intentionally), anxiety/depressed mood/suicidal ideation (\"stable\", feels safe, sees therapist every other week, talks to her psychologist and psychiatrist; no suicidal attempt in 7 years).    PHYSICAL EXAMINATION:   General: Awake. Alert. Comfortable. No apparent distress.   Speech: Normal  Comprehension: Normal  Mood: Stable  Affect: Appropriate  Eyes:   " Eyelids: normal            ENT:          Unable to assess patency of nasal passageways or for presence of nasal septum deviation. Unable to clearly view posterior oropharynx to assess tonsils, uvula, and Davies tongue score. Tongue scalloping is present, tongue is mildly enlarged, Retrognathia is not present. Dentition excellent.           Neck:          Circumference: increased in caliber  Cardiac:  unable to assess pulses or cardiac rate/rhythm  Pul:          Normal respiratory effort   Abd:         not assessed  Neuro: Alert, well-oriented. Cranial nerves II-XII grossly normal and symmetric. No abnormal movements noted        LABS/DIAGNOSTICS:  Lab Results   Component Value Date    HGB 13.4 05/10/2025    CO2 23 05/10/2025    TSH 1.92 05/10/2025    FREET4 0.9 05/10/2025    HGBA1C 5.5 05/10/2025    FERRITIN 18 05/10/2025    IRON 56 05/10/2025    TIBC 322 05/10/2025    IRONSAT 17 05/10/2025    VITD25 19 (L) 05/10/2025    [id number] 363 05/10/2025          ASSESSMENT AND PLAN: Ms. Sheron Mcmullen is a 29 y.o. female with a history of morbid obesity, PCOS, anxiety, and depression, who has her nightmares controlled with prazosin, and she is going through the bariatric surgery process. She has no reported sleep disordered breathing and has daytime sleepiness/fatigue that she attributes to being worn our after babysitting little children. Due to the potential for increased perioperative risks in the bariatric surgery population, testing for sleep apnea and treating if needed prior to surgery is recommended.    #morbid obesity  #preoperative clearance  #bariatric surgery status  -home sleep study  -clearance depends on sleep study results and if CPAP compliance is needed    #nightmare disorder - controlled with prazosin 1 mg nightly  -prazosin per prescribing provider    #depression   -managed per her other providers  -pt reports she is stable from mental health standpoint    #marijuana user, continuous  -pt was  "using marijuana daily and stopped yesterday in prep for her surgery  -warned her that she may have rebound vivid dreams for at least a year if she quits \"cold turkey\", so I advised her to taper off over the next couple of weeks    All of the above was discussed with the patient in detail. She voiced an understanding of the above and was agreeable to proceed further as advised. Procedure for the sleep study was discussed with her.    33 minuteswere spent on this encounter, including time reviewing the chart, conducting the H&P, counseling the patient, and documenting/placing orders.    FOLLOW UP:  After study to discuss results         [1]   Patient Active Problem List  Diagnosis    Mood disorder ([facility])    Atopic dermatitis    Attention deficit hyperactivity disorder (ADHD), predominantly inattentive type    Dissociative convulsions    Psychogenic nonepileptic seizure    Reactive airway disease ([facility])    Seizure (Multi)    Class 3 severe obesity due to excess calories with serious comorbidity and body mass index (BMI) of 40.0 to 44.9 in adult    Polyphagia    Inappropriate diet and eating habits    Inadequate exercise    Inadequate caloric intake    History of narcotic addiction (Multi)   [2]   Past Medical History:  Diagnosis Date    Acute respiratory distress     Respiratory distress    Addiction to drug (Multi)     ADHD (attention deficit hyperactivity disorder)     Allergy status to unspecified drugs, medicaments and biological substances     History of seasonal allergies    Anxiety     Bipolar disorder     Depression, unspecified 12/21/2013    Depression    Obsessive-compulsive disorder     Other specified respiratory disorders     Viral respiratory infection    Panic attack     Panic disorder     Personal history of other specified conditions     History of chest pain    Personal history of other specified conditions     History of wheezing    Personal history of pneumonia (recurrent)     History of " pneumonia    PTSD (post-traumatic stress disorder)     Seizures (Multi)     Suicide attempt (Multi)     Unspecified asthma, uncomplicated ([facility])     RAD (reactive airway disease)   [3]   Past Surgical History:  Procedure Laterality Date    LEG SURGERY      2021- lesion removed and partial bone removal.    OTHER SURGICAL HISTORY  10/30/2020    Tonsillectomy   [4]   Current Outpatient Medications   Medication Sig Dispense Refill    albuterol (Ventolin HFA) 90 mcg/actuation inhaler Inhale 2 puffs every 4 hours if needed for wheezing or shortness of breath. 18 g 3    albuterol 2.5 mg /3 mL (0.083 %) nebulizer solution Take 3 mL (2.5 mg) by nebulization every 4 hours if needed for wheezing or shortness of breath. 75 mL 3    busPIRone (Buspar) 10 mg tablet Take 1 tablet (10 mg) by mouth every 12 hours. 60 tablet 2    cetirizine-pseudoephedrine (ZyrTEC-D) 5-120 mg 12 hr tablet Take 1 tablet by mouth 2 times a day as needed for allergies. 14 tablet 0    fluticasone (Flonase) 50 mcg/actuation nasal spray Administer 1 spray into each nostril once daily. Shake gently. Before first use, prime pump. After use, clean tip and replace cap. (Patient taking differently: Administer 1 spray into each nostril once daily as needed for allergies. Shake gently. Before first use, prime pump. After use, clean tip and replace cap.) 16 g 0    norethindrone-e.estradioL-iron (Junel FE 1/20, 28,) 1 mg-20 mcg (21)/75 mg (7) tablet Take 1 tablet by mouth once daily.      prazosin (Minipress) 1 mg capsule Take 1 capsule (1 mg) by mouth once daily at bedtime. 30 capsule 3    sertraline (Zoloft) 50 mg tablet Take 1 tablet (50 mg) by mouth once daily. 90 tablet 1    ibuprofen 800 mg tablet Take 1 tablet (800 mg) by mouth 3 times a day as needed for mild pain (1 - 3) (pain). 90 tablet 3     No current facility-administered medications for this visit.   [5]   Allergies  Allergen Reactions    Metformin Other     Abd.pain/cramps, multiple episodes of  diarrhea per day     Animal Dander Swelling    Cat Dander Swelling    Dog Dander Swelling    Milk Swelling    Penicillin G Hives    Pollen Extracts Swelling    Soy Diarrhea    Soybean Hives   [6]   Family History  Problem Relation Name Age of Onset    Blood clot Mother Griselda Palm     Cancer Mother Griselda Palm     Diabetes Mother Griselda Palm     Genetic Testing Mother Griselda Palm     Blood clot Mother's Sister      Blood clot Mother's Sister Sisi Arpita     Breast cancer Mother's Sister Sisi Arpita     Hypertension Mother's Sister Mery Garo     Paranoid behavior Mother's Sister Stephany Minor     Schizophrenia Mother's Sister Stephany Minor     Mental illness Mother's Sister Stephany Minor     Blood clot Mother's Sister Sisi Arpita     Breast cancer Mother's Sister Sisi Arpita     Hypertension Mother's Sister Mery Wyman     Mental illness Mother's Sister Stephany Minor     Heart disease Mother's Brother Santana Davyjoel     Heart disease Mother's Brother Santana Davyjoel     Alcohol abuse Maternal Grandmother Deedee Arpita     Hearing loss Maternal Grandmother Deedee Arpita     Miscarriages / Stillbirths Maternal Grandmother Deedee Arpita     Stroke Maternal Grandmother Deedee Palm     Vision loss Maternal Grandmother Deedee Arpita     Blood clot Maternal Grandfather Hai Palm     Diabetes Maternal Grandfather Hai Palm     Heart disease Maternal Grandfather Hai Palm     Sleep apnea Maternal Grandfather Hai Palm     Diabetes Paternal Grandmother Eva Milan     Cancer Paternal Grandfather Stoney Milan

## 2025-05-16 LAB
25(OH)D3+25(OH)D2 SERPL-MCNC: 19 NG/ML (ref 30–100)
ALBUMIN SERPL-MCNC: 4.1 G/DL (ref 3.6–5.1)
ALP SERPL-CCNC: 71 U/L (ref 31–125)
ALT SERPL-CCNC: 18 U/L (ref 6–29)
ANION GAP SERPL CALCULATED.4IONS-SCNC: 10 MMOL/L (CALC) (ref 7–17)
APTT PPP: 33 SEC (ref 23–32)
AST SERPL-CCNC: 14 U/L (ref 10–30)
BASOPHILS # BLD AUTO: 41 CELLS/UL (ref 0–200)
BASOPHILS NFR BLD AUTO: 0.5 %
BILIRUB SERPL-MCNC: 0.3 MG/DL (ref 0.2–1.2)
BUN SERPL-MCNC: 12 MG/DL (ref 7–25)
C PEPTIDE SERPL-MCNC: 4.1 NG/ML (ref 0.8–3.85)
CALCIUM SERPL-MCNC: 8.9 MG/DL (ref 8.6–10.2)
CHLORIDE SERPL-SCNC: 107 MMOL/L (ref 98–110)
CHOLEST SERPL-MCNC: 183 MG/DL
CHOLEST/HDLC SERPL: 4.3 (CALC)
CO2 SERPL-SCNC: 23 MMOL/L (ref 20–32)
COPPER BLD-MCNC: NORMAL UG/DL
COTININE SERPL-MCNC: <2 NG/ML
CREAT SERPL-MCNC: 0.55 MG/DL (ref 0.5–0.96)
EGFRCR SERPLBLD CKD-EPI 2021: 128 ML/MIN/1.73M2
EOSINOPHIL # BLD AUTO: 312 CELLS/UL (ref 15–500)
EOSINOPHIL NFR BLD AUTO: 3.8 %
ERYTHROCYTE [DISTWIDTH] IN BLOOD BY AUTOMATED COUNT: 14.1 % (ref 11–15)
EST. AVERAGE GLUCOSE BLD GHB EST-MCNC: 111 MG/DL
EST. AVERAGE GLUCOSE BLD GHB EST-SCNC: 6.2 MMOL/L
FERRITIN SERPL-MCNC: 18 NG/ML (ref 16–154)
FOLATE SERPL-MCNC: 3.9 NG/ML
GLUCOSE SERPL-MCNC: 89 MG/DL (ref 65–99)
HBA1C MFR BLD: 5.5 %
HCT VFR BLD AUTO: 41.5 % (ref 35–45)
HDLC SERPL-MCNC: 43 MG/DL
HGB BLD-MCNC: 13.4 G/DL (ref 11.7–15.5)
INR PPP: 0.9
IRON SATN MFR SERPL: 17 % (CALC) (ref 16–45)
IRON SERPL-MCNC: 56 MCG/DL (ref 40–190)
LDLC SERPL CALC-MCNC: 103 MG/DL (CALC)
LYMPHOCYTES # BLD AUTO: 2747 CELLS/UL (ref 850–3900)
LYMPHOCYTES NFR BLD AUTO: 33.5 %
MCH RBC QN AUTO: 28.3 PG (ref 27–33)
MCHC RBC AUTO-ENTMCNC: 32.3 G/DL (ref 32–36)
MCV RBC AUTO: 87.7 FL (ref 80–100)
MONOCYTES # BLD AUTO: 558 CELLS/UL (ref 200–950)
MONOCYTES NFR BLD AUTO: 6.8 %
NEUTROPHILS # BLD AUTO: 4543 CELLS/UL (ref 1500–7800)
NEUTROPHILS NFR BLD AUTO: 55.4 %
NICOTINE SERPL-MCNC: <2 NG/ML
NONHDLC SERPL-MCNC: 140 MG/DL (CALC)
PLATELET # BLD AUTO: 215 THOUSAND/UL (ref 140–400)
PMV BLD REES-ECKER: 11.5 FL (ref 7.5–12.5)
POTASSIUM SERPL-SCNC: 4.2 MMOL/L (ref 3.5–5.3)
PROT SERPL-MCNC: 6.6 G/DL (ref 6.1–8.1)
PROTHROMBIN TIME: 10 SEC (ref 9–11.5)
PTH-INTACT SERPL-MCNC: 39 PG/ML (ref 16–77)
RBC # BLD AUTO: 4.73 MILLION/UL (ref 3.8–5.1)
SODIUM SERPL-SCNC: 140 MMOL/L (ref 135–146)
T4 FREE SERPL-MCNC: 0.9 NG/DL (ref 0.8–1.8)
TIBC SERPL-MCNC: 322 MCG/DL (CALC) (ref 250–450)
TRIGL SERPL-MCNC: 260 MG/DL
TSH SERPL-ACNC: 1.92 MIU/L
VIT A SERPL-MCNC: 40 MCG/DL (ref 38–98)
VIT B1 BLD-SCNC: 176 NMOL/L (ref 78–185)
VIT B12 SERPL-MCNC: 363 PG/ML (ref 200–1100)
WBC # BLD AUTO: 8.2 THOUSAND/UL (ref 3.8–10.8)
ZINC SERPL-MCNC: 71 MCG/DL (ref 60–130)

## 2025-05-19 ENCOUNTER — PROCEDURE VISIT (OUTPATIENT)
Dept: SLEEP MEDICINE | Facility: HOSPITAL | Age: 30
End: 2025-05-19
Payer: MEDICAID

## 2025-05-19 ENCOUNTER — APPOINTMENT (OUTPATIENT)
Dept: SURGERY | Facility: CLINIC | Age: 30
End: 2025-05-19
Payer: MEDICAID

## 2025-05-19 DIAGNOSIS — Z98.84 BARIATRIC SURGERY STATUS: ICD-10-CM

## 2025-05-19 DIAGNOSIS — Z01.818 PRE-OPERATIVE CLEARANCE: ICD-10-CM

## 2025-05-19 DIAGNOSIS — E66.813 CLASS 3 SEVERE OBESITY DUE TO EXCESS CALORIES WITH SERIOUS COMORBIDITY AND BODY MASS INDEX (BMI) OF 40.0 TO 44.9 IN ADULT: ICD-10-CM

## 2025-05-19 PROCEDURE — 95806 SLEEP STUDY UNATT&RESP EFFT: CPT | Performed by: PSYCHIATRY & NEUROLOGY

## 2025-05-19 NOTE — PROGRESS NOTES
"Bariatric Surgery Program - [facility]    PREOPERATIVE, MULTIDISCIPLINARY, MEDICALLY SUPERVISED, REDUCED CALORIE DIET, BEHAVIOR MODIFICATION AND EXERCISE PROGRAM    Date:2025 Patient Name: Sheron Mcmullen Patient : 1995    Subjective : The pt feels like she is making better food choices. She feels like she struggles with night time snacks and the \"before\" of the 30s. Has the night time snack 2-3x per week. Feels like she is having improvements with recognizing her satiety and not always finishing her plate. Has been using G. Figueroa salad dressings. Has been counting her protein to be sure that she is getting 20g of protein. Has been drinking water and SF packets and has been meeting her goal. Has a small polar pop 2x per week. Has switched to decaf and uses Premier protein as her creamer. Has cut out diet soda. Just ordered more resistance bands.     B: eggs w/cottage cheese and 1 hash brown   L: low carb wrap w/lettuce and chicken/turkey w/low kcal dressing  D: Banza noodles w/ground turkey and a salad   S: nutrigrain bar     Objective :  Wt:   Wt Readings from Last 1 Encounters:   25 107 kg (236 lb 4.8 oz)        Ht:      BMI: There is no height or weight on file to calculate BMI.    Dietary recommendation:   1. Continue to drink 64oz of noncarbonated/caffeine free/sugar free beverages. Count your polar pop as a special snack.   2. Practice the 30-30-30 rule by drinking between meals. Set reminders on your phone to help you remember.   3. Structure your meal plan - have 3 meals and 1 snack daily.  4. Have balanced meals that always contain a good source of protein.  5. Increase intake of non-starchy vegetables.  Have 5 servings fruits and vegetables daily.   6. Continue to work on emotional eating.   7. Continue your exercise routine. Increase physical activity by 10-15 minutes to an end goal of 60 minutes 5 x per week.    Group Topic: Put Your Beverage to the " Test    Behavioral recommendation: Pt is encouraged to identify appropriate beverages that can be tolerated post weight loss surgery.    Assessment/Plan :  Pt appears to have a good understanding of how to choose beverages that are appropriate for the weight loss surgery patient.  Ms. Mcmullen has a goal to consume a minimum of 8 cups of sugar free, non carbonated beverages daily. The pt is doing well and meeting their goals. Is cleared from a nutrition standpoint. Will follow up next month for accountability.         Goal: 5% body weight loss over the course of program      Exercise: daily resistance (resistance bands and 2lbs weight) exercises for 35 minutes;       Mahnaz Anderson MS, RD, LD  Phone: [phone]

## 2025-05-20 LAB
25(OH)D3+25(OH)D2 SERPL-MCNC: 19 NG/ML (ref 30–100)
ALBUMIN SERPL-MCNC: 4.1 G/DL (ref 3.6–5.1)
ALP SERPL-CCNC: 71 U/L (ref 31–125)
ALT SERPL-CCNC: 18 U/L (ref 6–29)
ANION GAP SERPL CALCULATED.4IONS-SCNC: 10 MMOL/L (CALC) (ref 7–17)
APTT PPP: 33 SEC (ref 23–32)
AST SERPL-CCNC: 14 U/L (ref 10–30)
BASOPHILS # BLD AUTO: 41 CELLS/UL (ref 0–200)
BASOPHILS NFR BLD AUTO: 0.5 %
BILIRUB SERPL-MCNC: 0.3 MG/DL (ref 0.2–1.2)
BUN SERPL-MCNC: 12 MG/DL (ref 7–25)
C PEPTIDE SERPL-MCNC: 4.1 NG/ML (ref 0.8–3.85)
CALCIUM SERPL-MCNC: 8.9 MG/DL (ref 8.6–10.2)
CHLORIDE SERPL-SCNC: 107 MMOL/L (ref 98–110)
CHOLEST SERPL-MCNC: 183 MG/DL
CHOLEST/HDLC SERPL: 4.3 (CALC)
CO2 SERPL-SCNC: 23 MMOL/L (ref 20–32)
COPPER BLD-MCNC: 94 MCG/DL
COTININE SERPL-MCNC: <2 NG/ML
CREAT SERPL-MCNC: 0.55 MG/DL (ref 0.5–0.96)
EGFRCR SERPLBLD CKD-EPI 2021: 128 ML/MIN/1.73M2
EOSINOPHIL # BLD AUTO: 312 CELLS/UL (ref 15–500)
EOSINOPHIL NFR BLD AUTO: 3.8 %
ERYTHROCYTE [DISTWIDTH] IN BLOOD BY AUTOMATED COUNT: 14.1 % (ref 11–15)
EST. AVERAGE GLUCOSE BLD GHB EST-MCNC: 111 MG/DL
EST. AVERAGE GLUCOSE BLD GHB EST-SCNC: 6.2 MMOL/L
FERRITIN SERPL-MCNC: 18 NG/ML (ref 16–154)
FOLATE SERPL-MCNC: 3.9 NG/ML
GLUCOSE SERPL-MCNC: 89 MG/DL (ref 65–99)
HBA1C MFR BLD: 5.5 %
HCT VFR BLD AUTO: 41.5 % (ref 35–45)
HDLC SERPL-MCNC: 43 MG/DL
HGB BLD-MCNC: 13.4 G/DL (ref 11.7–15.5)
INR PPP: 0.9
IRON SATN MFR SERPL: 17 % (CALC) (ref 16–45)
IRON SERPL-MCNC: 56 MCG/DL (ref 40–190)
LDLC SERPL CALC-MCNC: 103 MG/DL (CALC)
LYMPHOCYTES # BLD AUTO: 2747 CELLS/UL (ref 850–3900)
LYMPHOCYTES NFR BLD AUTO: 33.5 %
MCH RBC QN AUTO: 28.3 PG (ref 27–33)
MCHC RBC AUTO-ENTMCNC: 32.3 G/DL (ref 32–36)
MCV RBC AUTO: 87.7 FL (ref 80–100)
MONOCYTES # BLD AUTO: 558 CELLS/UL (ref 200–950)
MONOCYTES NFR BLD AUTO: 6.8 %
NEUTROPHILS # BLD AUTO: 4543 CELLS/UL (ref 1500–7800)
NEUTROPHILS NFR BLD AUTO: 55.4 %
NICOTINE SERPL-MCNC: <2 NG/ML
NONHDLC SERPL-MCNC: 140 MG/DL (CALC)
PLATELET # BLD AUTO: 215 THOUSAND/UL (ref 140–400)
PMV BLD REES-ECKER: 11.5 FL (ref 7.5–12.5)
POTASSIUM SERPL-SCNC: 4.2 MMOL/L (ref 3.5–5.3)
PROT SERPL-MCNC: 6.6 G/DL (ref 6.1–8.1)
PROTHROMBIN TIME: 10 SEC (ref 9–11.5)
PTH-INTACT SERPL-MCNC: 39 PG/ML (ref 16–77)
RBC # BLD AUTO: 4.73 MILLION/UL (ref 3.8–5.1)
SODIUM SERPL-SCNC: 140 MMOL/L (ref 135–146)
T4 FREE SERPL-MCNC: 0.9 NG/DL (ref 0.8–1.8)
TIBC SERPL-MCNC: 322 MCG/DL (CALC) (ref 250–450)
TRIGL SERPL-MCNC: 260 MG/DL
TSH SERPL-ACNC: 1.92 MIU/L
VIT A SERPL-MCNC: 40 MCG/DL (ref 38–98)
VIT B1 BLD-SCNC: 176 NMOL/L (ref 78–185)
VIT B12 SERPL-MCNC: 363 PG/ML (ref 200–1100)
WBC # BLD AUTO: 8.2 THOUSAND/UL (ref 3.8–10.8)
ZINC SERPL-MCNC: 71 MCG/DL (ref 60–130)

## 2025-05-21 ENCOUNTER — PATIENT MESSAGE (OUTPATIENT)
Dept: SURGERY | Facility: CLINIC | Age: 30
End: 2025-05-21

## 2025-05-21 ENCOUNTER — APPOINTMENT (OUTPATIENT)
Dept: BEHAVIORAL HEALTH | Facility: CLINIC | Age: 30
End: 2025-05-21
Payer: MEDICAID

## 2025-05-21 DIAGNOSIS — F43.10 PTSD (POST-TRAUMATIC STRESS DISORDER): ICD-10-CM

## 2025-05-21 DIAGNOSIS — Z98.84 H/O BARIATRIC SURGERY: ICD-10-CM

## 2025-05-21 DIAGNOSIS — E55.9 VITAMIN D DEFICIENCY: ICD-10-CM

## 2025-05-21 DIAGNOSIS — F44.5 PSYCHOGENIC NONEPILEPTIC SEIZURE: ICD-10-CM

## 2025-05-21 DIAGNOSIS — Z98.84 HISTORY OF BARIATRIC SURGERY: ICD-10-CM

## 2025-05-21 DIAGNOSIS — F41.8 OTHER SPECIFIED ANXIETY DISORDERS: ICD-10-CM

## 2025-05-21 DIAGNOSIS — F32.89 OTHER DEPRESSION: ICD-10-CM

## 2025-05-21 DIAGNOSIS — R89.9 ABNORMAL LABORATORY TEST RESULT: ICD-10-CM

## 2025-05-21 DIAGNOSIS — E53.8 FOLIC ACID DEFICIENCY: ICD-10-CM

## 2025-05-21 PROCEDURE — 90834 PSYTX W PT 45 MINUTES: CPT | Performed by: PSYCHOLOGIST

## 2025-05-21 RX ORDER — ERGOCALCIFEROL 1.25 MG/1
1.25 CAPSULE ORAL 2 TIMES WEEKLY
Qty: 16 CAPSULE | Refills: 0 | Status: SHIPPED | OUTPATIENT
Start: 2025-05-22 | End: 2025-07-15

## 2025-05-21 RX ORDER — FOLIC ACID 0.4 MG
400 TABLET ORAL DAILY
Qty: 30 TABLET | Refills: 2 | Status: SHIPPED | OUTPATIENT
Start: 2025-05-21 | End: 2025-08-19

## 2025-05-23 NOTE — PROGRESS NOTES
Verbal consent was requested and obtained from patient on this date for a telehealth visit.  The telehealth session connected with the patient at her home.    Start time: 5:00 PM  End time: 5:45 PM    Sheron is a 29-year-old woman with PNES referred by Dr. Patel who returns for cognitive behavioral psychotherapy after an extended absence. She has been experiencing episodes since .  She reported that these episodes can occur  as frequently as 2-3 times daily. Triggers seem to be stress, anxiety, driving at night and exposure to bright lights. She has described the episodes as whole body tremors with fists clenching, sometimes leading to, or consequence of, panic. She recovers by sleeping.     We have resumed cognitive behavioral effort to work on exposure to anxiety-provoking stimuli and an exposure plus response prevention model for dealing with agoraphobia from a cognitive behavioral perspective.    Our session addressed several issues, includin.  Our goals for her include:  A.  Address agoraphobia and her strategies of fighting through it with walking and taking trips out of state  B.  Pursue grief counseling following the death of her stepmother last year.    C.  Maintain medications as prescribed by Dr. Patel  D.  Continue to see a therapist every other week at [facility] counseling to review everyday life, anger, repeated regression and OCD.  E.  Self monitor episodes.     2.  We discussed the benefits of having received a service dog whom she has named Estevanvickyradha Quiles.  She is a psychiatric service dog for anxiety and is helpful when Sheron becomes panicked.  She specifies that she is not a medical service dog and is not oriented to being predictive of or attentive to PNES.  Her having had her has led to an increased sense of security and confidence for Sheron and she has been much more likely to confront her agoraphobia and reduce the degree to which she has been housebound.  She walks the  dog on a daily basis and has been more actively engaged in getting out of the house.  At the same time her seizure frequency is down to approximately just 1/month.  In March she had an episode when she did not get adequate rest, in April when she was first coming out of sleep and in May she had an episode whose trigger was hard for her to define.    3.  She has decided to pursue bariatric surgery.  She had a high weight of 270 pounds and was 259 pounds when she started the bariatric evaluation.  She now weighs 247 pounds that she has begun to incorporate the strategies that have been recommended for her by the nutrition staff.  At some point, we will do a formal evaluation for her psychological candidacy for bariatric surgery.    4.  For the first time we talked at greater length about her auditory hallucinations which are somewhat suppressed with her use of pot.  Those hallucinations began when she was 9 years old and in part it was the antipsychotic regimen that her mother insisted upon that prompted weight gain according to her.  She quit antipsychotics at age 19 and has been working to self-manage her hallucinations since that time.    5.  Some psychotherapy content is not included in this note to protect patient confidentiality.    6.  She will contact our office to schedule a follow-up appointment.

## 2025-05-27 ENCOUNTER — APPOINTMENT (OUTPATIENT)
Dept: SURGERY | Facility: CLINIC | Age: 30
End: 2025-05-27
Payer: MEDICAID

## 2025-05-28 ENCOUNTER — APPOINTMENT (OUTPATIENT)
Dept: CARDIOLOGY | Facility: CLINIC | Age: 30
End: 2025-05-28
Payer: MEDICAID

## 2025-05-28 VITALS
OXYGEN SATURATION: 95 % | BODY MASS INDEX: 42.57 KG/M2 | SYSTOLIC BLOOD PRESSURE: 100 MMHG | WEIGHT: 248 LBS | HEART RATE: 74 BPM | DIASTOLIC BLOOD PRESSURE: 70 MMHG

## 2025-05-28 DIAGNOSIS — E66.813 CLASS 3 SEVERE OBESITY DUE TO EXCESS CALORIES WITH SERIOUS COMORBIDITY AND BODY MASS INDEX (BMI) OF 40.0 TO 44.9 IN ADULT: ICD-10-CM

## 2025-05-28 DIAGNOSIS — Z13.21 ENCOUNTER FOR VITAMIN DEFICIENCY SCREENING: ICD-10-CM

## 2025-05-28 DIAGNOSIS — J45.20 MILD INTERMITTENT REACTIVE AIRWAY DISEASE WITHOUT COMPLICATION (HHS-HCC): ICD-10-CM

## 2025-05-28 DIAGNOSIS — F44.5 PSYCHOGENIC NONEPILEPTIC SEIZURE: ICD-10-CM

## 2025-05-28 DIAGNOSIS — J45.20 INTERMITTENT ASTHMA WITHOUT COMPLICATION, UNSPECIFIED ASTHMA SEVERITY (HHS-HCC): Primary | ICD-10-CM

## 2025-05-28 DIAGNOSIS — Z01.818 PREOPERATIVE CLEARANCE: ICD-10-CM

## 2025-05-28 DIAGNOSIS — F39 MOOD DISORDER: ICD-10-CM

## 2025-05-28 PROCEDURE — 93000 ELECTROCARDIOGRAM COMPLETE: CPT

## 2025-05-28 PROCEDURE — 1036F TOBACCO NON-USER: CPT

## 2025-05-28 PROCEDURE — 99204 OFFICE O/P NEW MOD 45 MIN: CPT

## 2025-05-28 NOTE — PATIENT INSTRUCTIONS
- In the context of this evaluation, there are no contraindications from a cardiovascular standpoint to proceed with surgery. No further cardiovascular testing is required.  - Patient will follow up with me in the Cardiology office in 1 year or as needed   yes

## 2025-05-28 NOTE — H&P (VIEW-ONLY)
Location of visit: [address]   Type of Visit: General Cardiology    Chief Complaint:  Patient was referred to Cardiology by Dr. Alfaro for preop evaluation    History Of Present Illness:    Sheron Mcmullen is a 29 y.o. female, with history significant for obesity, mild asthma, mood disorder, psychogenic non-epileptic seizures, and a history of short-term opioid use in adolescencewho visits Cardiology today as a new patient  for preop evaluation.    History of class III morbid obesity (BMI 40.56 kg/m²) being evaluated for preoperative clearance for bariatric surgery (considering sleeve gastrectomy).  Not known structural heart disease or prior cardiovascular events, and currently denies chest pain, dyspnea, palpitations, or syncope.  She uses marijuana daily for anxiety.     The patient states normal functional capacity.  She walks every day.  She does not work because she is on disability due to mental health and seizure problems.  She states she can walk up to 7 miles without any cardiovascular symptoms including chest pain, chest pressure, palpitations, shortness of breath.  She had had episodes of shortness of breath but she thinks they are related to asthma because after doing puffs or breathing treatments that improves and she can perform physical activity with no problem.  She has family history of cardiac problems on his grandfather and an uncle: Congestive heart failure. Also one episode of sudden death in the family. She denies chest pain, dyspnea on exertion, orthopnea, PND, nocturia, edema, palpitations, dizziness, lightheadedness, syncope.    Blood pressure today: 100/70 mmHg    Today's ECG shows sinus rhythm at 62 bpm, normal AV conduction, and normal ventricular repolarization.    Past Medical History:  She has a past medical history of Acute respiratory distress, Addiction to drug (Multi), ADHD (attention deficit hyperactivity disorder), Allergy status to unspecified drugs, medicaments and  biological substances, Anxiety, Bipolar disorder, Depression, unspecified (12/21/2013), Obsessive-compulsive disorder, Other specified respiratory disorders, Panic attack, Panic disorder, Personal history of other specified conditions, Personal history of other specified conditions, Personal history of pneumonia (recurrent), PTSD (post-traumatic stress disorder), Seizures (Multi), Suicide attempt (Multi), and Unspecified asthma, uncomplicated ([facility]).    Past Surgical History:  She has a past surgical history that includes Other surgical history (10/30/2020) and Leg Surgery.    Social History:  She reports that she has quit smoking. Her smoking use included cigarettes. She has never used smokeless tobacco. She reports current drug use. Drug: Marijuana. She reports that she does not drink alcohol.    Family History:  Family History[1]  Allergies:  Metformin, Animal dander, Cat dander, Dog dander, Milk, Penicillin g, Pollen extracts, Soy, and Soybean    Outpatient Medications:  Current Outpatient Medications   Medication Instructions    albuterol (Ventolin HFA) 90 mcg/actuation inhaler 2 puffs, inhalation, Every 4 hours PRN    albuterol 2.5 mg, nebulization, Every 4 hours PRN    busPIRone (BUSPAR) 10 mg, oral, Every 12 hours    cetirizine-pseudoephedrine (ZyrTEC-D) 5-120 mg 12 hr tablet 1 tablet, oral, 2 times daily PRN    ergocalciferol (DRISDOL) 1.25 mg, oral, 2 times weekly, Take 1 Capsule Twice Weekly By Mouth for 8 Weeks d/t Lab Resulted Vitam Deficiency (<20).    fluticasone (Flonase) 50 mcg/actuation nasal spray 1 spray, Each Nostril, Daily, Shake gently. Before first use, prime pump. After use, clean tip and replace cap.    folic acid (FOLVITE) 0.4 mg, oral, Daily    ibuprofen 800 mg, oral, 3 times daily PRN    norethindrone-e.estradioL-iron (Junel FE 1/20, 28,) 1 mg-20 mcg (21)/75 mg (7) tablet 1 tablet, Daily    prazosin (MINIPRESS) 1 mg, oral, Nightly    sertraline (ZOLOFT) 50 mg, oral, Daily     Last  "Recorded Vitals:  There were no vitals filed for this visit.    Physical Exam:      4/30/2025     2:31 PM 4/23/2025     1:04 PM 3/19/2025    10:48 AM 12/18/2024    11:21 AM 11/13/2024     1:30 PM 8/27/2024     2:02 PM   Vitals   Systolic 120  114 120 114 120   Diastolic 75  73 78 80 78   BP Location Right arm        Heart Rate 74  73 85 86 73   Temp   36.6 °C (97.9 °F) 36.5 °C (97.7 °F) 36.2 °C (97.1 °F) 36.2 °C (97.1 °F)   Height 1.626 m (5' 4\") 1.626 m (5' 4\") 1.626 m (5' 4\")  1.626 m (5' 4\")    Weight (lb) 236.3 248 259 256 260 259   BMI 40.56 kg/m2 42.57 kg/m2 44.46 kg/m2 43.94 kg/m2 44.63 kg/m2 44.46 kg/m2   BSA (m2) 2.2 m2 2.25 m2 2.3 m2 2.29 m2 2.31 m2 2.3 m2   Visit Report Report  Report Report Report Report     Wt Readings from Last 5 Encounters:   04/30/25 107 kg (236 lb 4.8 oz)   04/23/25 112 kg (248 lb)   03/19/25 117 kg (259 lb)   12/18/24 116 kg (256 lb)   11/13/24 118 kg (260 lb)       Physical Exam  Vitals reviewed.   HENT:      Head: Normocephalic.      Mouth/Throat:      Pharynx: Oropharynx is clear.   Eyes:      Pupils: Pupils are equal, round, and reactive to light.   Cardiovascular:      Rate and Rhythm: Normal rate.      Pulses: Normal pulses.      Heart sounds: Normal heart sounds.   Pulmonary:      Effort: Pulmonary effort is normal.      Breath sounds: Normal breath sounds.   Abdominal:      General: Abdomen is flat. Bowel sounds are normal.      Palpations: Abdomen is soft.   Musculoskeletal:         General: Normal range of motion.   Skin:     General: Skin is warm and dry.   Neurological:      General: No focal deficit present.      Mental Status: She is alert.   Psychiatric:         Mood and Affect: Mood normal.            @PESEC->PESEC(CIRCULAR REFERENCE)@      Last Labs Reviewed:  CBC -  Recent Labs     05/10/25  0907 03/27/22  1453 02/27/19  1552 11/26/18  0843 02/19/18  2114   WBC 8.2 10.1 10.1 10.0 9.9   HGB 13.4 13.6 14.2 13.4 15.0   HCT 41.5 39.3 44.4* 39.4 44.4*    202 224 " "186 186   MCV 87.7 86.6 91.0 86.2 87.4     CMP -  Recent Labs     05/10/25  0907 03/27/22  1453 02/27/19  1552 11/26/18  0843 02/19/18  2114    138 139 139 137   K 4.2 4.0 4.4 3.7 3.6    104 105 104 98   CO2 23 20* 22* 23* 26   ANIONGAP 10 14 12 12 13   BUN 12 8 7* 9 10   CREATININE 0.55 0.7 0.7 0.7 0.8   EGFR 128 123 111 112 96   CALCIUM 8.9 9.2 9.1 9.2 9.8     Recent Labs     05/10/25  0907 03/27/22  1453 11/26/18  0843 02/19/18  2114   ALBUMIN 4.1 3.9 3.7 4.4   ALKPHOS 71 102 84 84   ALT 18 28 14 12   AST 14 12 11 11   BILITOT 0.3 0.2 0.3 0.3     LIPID PANEL -   Recent Labs     05/10/25  0907   CHOL 183   LDLCALC 103*   HDL 43*   TRIG 260*     COAGULATION PANEL -  Recent Labs     05/10/25  0907 11/26/18  0843   INR 0.9 1.0     HEME/ENDO -  Recent Labs     05/10/25  0907   FERRITIN 18   IRONSAT 17   TSH 1.92   HGBA1C 5.5   FREET4 0.9     CARDIOVASCULAR  No results for input(s): \"LDH\", \"CKMB\", \"TROPHS\", \"BNP\", \"CRP\" in the last 65451 hours.    No lab exists for component: \"CK\", \"CKMBP\", \"CRPUS\", \"USCRP\"  Last Cardiology/Imaging Tests Personally Reviewed (if images available) and Interpreted:  ECG:  No results found for this or any previous visit (from the past 4464 hours).No results found for this or any previous visit from the past 1095 days.    Echocardiogram:  No results found for this or any previous visit from the past 1825 days.  No results found for this or any previous visit from the past 1095 days.    Cath:  No results found for this or any previous visit from the past 1825 days.  No results found for this or any previous visit from the past 3650 days.  No results found for this or any previous visit from the past 1095 days.    Stress Test:  No results found for this or any previous visit from the past 1825 days.  No results found for this or any previous visit from the past 1095 days.    Cardiac CT/MRI:  No results found for this or any previous visit from the past 1825 days.  No results found " for this or any previous visit from the past 1095 days.    Other CT:      CV RISK FACTORS:   # Hypertension: Last BP:  .  # Hyperlipidemia: Last Tchol 183 / LDL No results found for requested labs within last 365 days. / HDL 43 / TRIG 260 (5/10/2025:  9:07 AM).  # Type II Diabetes Mellitus: Last A1c 5.5 (5/10/2025:  9:07 AM).  # Obesity: Last BMI:  .  # CKD: Last BUN/Cr (GFR): 12/0.55 (128), 5/10/2025:  9:07 AM.    ASCV RISK:  The ASCVD Risk score (Minh HARRINGTON, et al., 2019) failed to calculate for the following reasons:    The 2019 ASCVD risk score is only valid for ages 40 to 79    Assessment/Plan   Sheron Mcmullen is a 29 y.o. female, with history significant for obesity, mild asthma, mood disorder, psychogenic non-epileptic seizures, and a history of short-term opioid use in adolescencewho visits Cardiology today as a new patient  for preop evaluation.    #Preop Evaluation  Preoperative Cardiovascular Assesment  Assessment of functional capacity: Normal > 4METS  2. ECG shows sinus rhythm at 62 bpm, normal AV conduction, and normal ventricular repolarization.  3. Revised cardiac risk index: No risk factors    In the context of this evaluation, there are no contraindications from a cardiovascular standpoint to proceed with surgery. No further cardiovascular testing is required.    #Obesity  #Asthma    Assessment  29 year old female with morbid obesity (BMI 40), evaluated today for preoperative cardiac clearance prior to bariatric surgery.   Her medical history includes mild asthma, mood disorder, psychogenic nonepileptic seizures, and marijuana use for anxiety. She denies any cardiovascular symptoms such as chest pain, dyspnea on exertion, orthopnea, palpitations, or syncope. Functional capacity is excellent, with self-reported ability to walk up to 7 miles without symptoms, suggesting >4 METs. There is no known structural heart disease or prior cardiovascular events. ECG shows normal sinus rhythm, normal AV  conduction, and normal ventricular repolarization. Blood pressure is within normal limits. Revised cardiac risk index is 0. Overall, from a cardiovascular perspective, she is low risk and there are no contraindications to proceeding with bariatric surgery. No further cardiac testing is indicated.    Recommendations:  - In the context of this evaluation, there are no contraindications from a cardiovascular standpoint to proceed with surgery. No further cardiovascular testing is required.  - Patient will follow up with me in the Cardiology office in 1 year or as needed  - I spent 45 minutes assessing the case between pre-charting, face-to-face patient interaction, and documentation    Jey Wheeler MD          [1]   Family History  Problem Relation Name Age of Onset    Blood clot Mother Griselda Palm     Cancer Mother Griselda Palm     Diabetes Mother Griselda Palm     Genetic Testing Mother Griselda Palm     Blood clot Mother's Sister      Blood clot Mother's Sister Sisi Averjoel     Breast cancer Mother's Sister Sisi Averman     Hypertension Mother's Sister Mery Braxtonrakel     Paranoid behavior Mother's Sister Stephany Minor     Schizophrenia Mother's Sister Stephany Minor     Mental illness Mother's Sister Stephany Minor     Blood clot Mother's Sister Sisi Averman     Breast cancer Mother's Sister Sisi Averman     Hypertension Mother's Sister Mery Milanjat     Mental illness Mother's Sister Stephany Minor     Heart disease Mother's Brother Santana Arpita     Heart disease Mother's Brother Santana Arpita     Alcohol abuse Maternal Grandmother Deedee Palm     Hearing loss Maternal Grandmother Deedee Palm     Miscarriages / Stillbirths Maternal Grandmother Deedee Palm     Stroke Maternal Grandmother Deedee Davyjoel     Vision loss Maternal Grandmother Deedee Palm     Blood clot Maternal Grandfather Hai Arpita     Diabetes Maternal Grandfather Hai Arpita     Heart disease Maternal  Grandfather Hai Palm     Sleep apnea Maternal Grandfather Hai Palm     Diabetes Paternal Grandmother Eva Milan     Cancer Paternal Grandfather Stoney Milan

## 2025-05-28 NOTE — PROGRESS NOTES
Location of visit: [address]   Type of Visit: General Cardiology    Chief Complaint:  Patient was referred to Cardiology by Dr. Alfaro for preop evaluation    History Of Present Illness:    Sheron Mcmullen is a 29 y.o. female, with history significant for obesity, mild asthma, mood disorder, psychogenic non-epileptic seizures, and a history of short-term opioid use in adolescencewho visits Cardiology today as a new patient  for preop evaluation.    History of class III morbid obesity (BMI 40.56 kg/m²) being evaluated for preoperative clearance for bariatric surgery (considering sleeve gastrectomy).  Not known structural heart disease or prior cardiovascular events, and currently denies chest pain, dyspnea, palpitations, or syncope.  She uses marijuana daily for anxiety.     The patient states normal functional capacity.  She walks every day.  She does not work because she is on disability due to mental health and seizure problems.  She states she can walk up to 7 miles without any cardiovascular symptoms including chest pain, chest pressure, palpitations, shortness of breath.  She had had episodes of shortness of breath but she thinks they are related to asthma because after doing puffs or breathing treatments that improves and she can perform physical activity with no problem.  She has family history of cardiac problems on his grandfather and an uncle: Congestive heart failure. Also one episode of sudden death in the family. She denies chest pain, dyspnea on exertion, orthopnea, PND, nocturia, edema, palpitations, dizziness, lightheadedness, syncope.    Blood pressure today: 100/70 mmHg    Today's ECG shows sinus rhythm at 62 bpm, normal AV conduction, and normal ventricular repolarization.    Past Medical History:  She has a past medical history of Acute respiratory distress, Addiction to drug (Multi), ADHD (attention deficit hyperactivity disorder), Allergy status to unspecified drugs, medicaments and  biological substances, Anxiety, Bipolar disorder, Depression, unspecified (12/21/2013), Obsessive-compulsive disorder, Other specified respiratory disorders, Panic attack, Panic disorder, Personal history of other specified conditions, Personal history of other specified conditions, Personal history of pneumonia (recurrent), PTSD (post-traumatic stress disorder), Seizures (Multi), Suicide attempt (Multi), and Unspecified asthma, uncomplicated ([facility]).    Past Surgical History:  She has a past surgical history that includes Other surgical history (10/30/2020) and Leg Surgery.    Social History:  She reports that she has quit smoking. Her smoking use included cigarettes. She has never used smokeless tobacco. She reports current drug use. Drug: Marijuana. She reports that she does not drink alcohol.    Family History:  Family History[1]  Allergies:  Metformin, Animal dander, Cat dander, Dog dander, Milk, Penicillin g, Pollen extracts, Soy, and Soybean    Outpatient Medications:  Current Outpatient Medications   Medication Instructions    albuterol (Ventolin HFA) 90 mcg/actuation inhaler 2 puffs, inhalation, Every 4 hours PRN    albuterol 2.5 mg, nebulization, Every 4 hours PRN    busPIRone (BUSPAR) 10 mg, oral, Every 12 hours    cetirizine-pseudoephedrine (ZyrTEC-D) 5-120 mg 12 hr tablet 1 tablet, oral, 2 times daily PRN    ergocalciferol (DRISDOL) 1.25 mg, oral, 2 times weekly, Take 1 Capsule Twice Weekly By Mouth for 8 Weeks d/t Lab Resulted Vitam Deficiency (<20).    fluticasone (Flonase) 50 mcg/actuation nasal spray 1 spray, Each Nostril, Daily, Shake gently. Before first use, prime pump. After use, clean tip and replace cap.    folic acid (FOLVITE) 0.4 mg, oral, Daily    ibuprofen 800 mg, oral, 3 times daily PRN    norethindrone-e.estradioL-iron (Junel FE 1/20, 28,) 1 mg-20 mcg (21)/75 mg (7) tablet 1 tablet, Daily    prazosin (MINIPRESS) 1 mg, oral, Nightly    sertraline (ZOLOFT) 50 mg, oral, Daily     Last  "Recorded Vitals:  There were no vitals filed for this visit.    Physical Exam:      4/30/2025     2:31 PM 4/23/2025     1:04 PM 3/19/2025    10:48 AM 12/18/2024    11:21 AM 11/13/2024     1:30 PM 8/27/2024     2:02 PM   Vitals   Systolic 120  114 120 114 120   Diastolic 75  73 78 80 78   BP Location Right arm        Heart Rate 74  73 85 86 73   Temp   36.6 °C (97.9 °F) 36.5 °C (97.7 °F) 36.2 °C (97.1 °F) 36.2 °C (97.1 °F)   Height 1.626 m (5' 4\") 1.626 m (5' 4\") 1.626 m (5' 4\")  1.626 m (5' 4\")    Weight (lb) 236.3 248 259 256 260 259   BMI 40.56 kg/m2 42.57 kg/m2 44.46 kg/m2 43.94 kg/m2 44.63 kg/m2 44.46 kg/m2   BSA (m2) 2.2 m2 2.25 m2 2.3 m2 2.29 m2 2.31 m2 2.3 m2   Visit Report Report  Report Report Report Report     Wt Readings from Last 5 Encounters:   04/30/25 107 kg (236 lb 4.8 oz)   04/23/25 112 kg (248 lb)   03/19/25 117 kg (259 lb)   12/18/24 116 kg (256 lb)   11/13/24 118 kg (260 lb)       Physical Exam  Vitals reviewed.   HENT:      Head: Normocephalic.      Mouth/Throat:      Pharynx: Oropharynx is clear.   Eyes:      Pupils: Pupils are equal, round, and reactive to light.   Cardiovascular:      Rate and Rhythm: Normal rate.      Pulses: Normal pulses.      Heart sounds: Normal heart sounds.   Pulmonary:      Effort: Pulmonary effort is normal.      Breath sounds: Normal breath sounds.   Abdominal:      General: Abdomen is flat. Bowel sounds are normal.      Palpations: Abdomen is soft.   Musculoskeletal:         General: Normal range of motion.   Skin:     General: Skin is warm and dry.   Neurological:      General: No focal deficit present.      Mental Status: She is alert.   Psychiatric:         Mood and Affect: Mood normal.            @PESEC->PESEC(CIRCULAR REFERENCE)@      Last Labs Reviewed:  CBC -  Recent Labs     05/10/25  0907 03/27/22  1453 02/27/19  1552 11/26/18  0843 02/19/18  2114   WBC 8.2 10.1 10.1 10.0 9.9   HGB 13.4 13.6 14.2 13.4 15.0   HCT 41.5 39.3 44.4* 39.4 44.4*    202 224 " "186 186   MCV 87.7 86.6 91.0 86.2 87.4     CMP -  Recent Labs     05/10/25  0907 03/27/22  1453 02/27/19  1552 11/26/18  0843 02/19/18  2114    138 139 139 137   K 4.2 4.0 4.4 3.7 3.6    104 105 104 98   CO2 23 20* 22* 23* 26   ANIONGAP 10 14 12 12 13   BUN 12 8 7* 9 10   CREATININE 0.55 0.7 0.7 0.7 0.8   EGFR 128 123 111 112 96   CALCIUM 8.9 9.2 9.1 9.2 9.8     Recent Labs     05/10/25  0907 03/27/22  1453 11/26/18  0843 02/19/18  2114   ALBUMIN 4.1 3.9 3.7 4.4   ALKPHOS 71 102 84 84   ALT 18 28 14 12   AST 14 12 11 11   BILITOT 0.3 0.2 0.3 0.3     LIPID PANEL -   Recent Labs     05/10/25  0907   CHOL 183   LDLCALC 103*   HDL 43*   TRIG 260*     COAGULATION PANEL -  Recent Labs     05/10/25  0907 11/26/18  0843   INR 0.9 1.0     HEME/ENDO -  Recent Labs     05/10/25  0907   FERRITIN 18   IRONSAT 17   TSH 1.92   HGBA1C 5.5   FREET4 0.9     CARDIOVASCULAR  No results for input(s): \"LDH\", \"CKMB\", \"TROPHS\", \"BNP\", \"CRP\" in the last 69945 hours.    No lab exists for component: \"CK\", \"CKMBP\", \"CRPUS\", \"USCRP\"  Last Cardiology/Imaging Tests Personally Reviewed (if images available) and Interpreted:  ECG:  No results found for this or any previous visit (from the past 4464 hours).No results found for this or any previous visit from the past 1095 days.    Echocardiogram:  No results found for this or any previous visit from the past 1825 days.  No results found for this or any previous visit from the past 1095 days.    Cath:  No results found for this or any previous visit from the past 1825 days.  No results found for this or any previous visit from the past 3650 days.  No results found for this or any previous visit from the past 1095 days.    Stress Test:  No results found for this or any previous visit from the past 1825 days.  No results found for this or any previous visit from the past 1095 days.    Cardiac CT/MRI:  No results found for this or any previous visit from the past 1825 days.  No results found " for this or any previous visit from the past 1095 days.    Other CT:      CV RISK FACTORS:   # Hypertension: Last BP:  .  # Hyperlipidemia: Last Tchol 183 / LDL No results found for requested labs within last 365 days. / HDL 43 / TRIG 260 (5/10/2025:  9:07 AM).  # Type II Diabetes Mellitus: Last A1c 5.5 (5/10/2025:  9:07 AM).  # Obesity: Last BMI:  .  # CKD: Last BUN/Cr (GFR): 12/0.55 (128), 5/10/2025:  9:07 AM.    ASCV RISK:  The ASCVD Risk score (Minh HARRINGTON, et al., 2019) failed to calculate for the following reasons:    The 2019 ASCVD risk score is only valid for ages 40 to 79    Assessment/Plan   Sheron Mcmullen is a 29 y.o. female, with history significant for obesity, mild asthma, mood disorder, psychogenic non-epileptic seizures, and a history of short-term opioid use in adolescencewho visits Cardiology today as a new patient  for preop evaluation.    #Preop Evaluation  Preoperative Cardiovascular Assesment  Assessment of functional capacity: Normal > 4METS  2. ECG shows sinus rhythm at 62 bpm, normal AV conduction, and normal ventricular repolarization.  3. Revised cardiac risk index: No risk factors    In the context of this evaluation, there are no contraindications from a cardiovascular standpoint to proceed with surgery. No further cardiovascular testing is required.    #Obesity  #Asthma    Assessment  29 year old female with morbid obesity (BMI 40), evaluated today for preoperative cardiac clearance prior to bariatric surgery.   Her medical history includes mild asthma, mood disorder, psychogenic nonepileptic seizures, and marijuana use for anxiety. She denies any cardiovascular symptoms such as chest pain, dyspnea on exertion, orthopnea, palpitations, or syncope. Functional capacity is excellent, with self-reported ability to walk up to 7 miles without symptoms, suggesting >4 METs. There is no known structural heart disease or prior cardiovascular events. ECG shows normal sinus rhythm, normal AV  conduction, and normal ventricular repolarization. Blood pressure is within normal limits. Revised cardiac risk index is 0. Overall, from a cardiovascular perspective, she is low risk and there are no contraindications to proceeding with bariatric surgery. No further cardiac testing is indicated.    Recommendations:  - In the context of this evaluation, there are no contraindications from a cardiovascular standpoint to proceed with surgery. No further cardiovascular testing is required.  - Patient will follow up with me in the Cardiology office in 1 year or as needed  - I spent 45 minutes assessing the case between pre-charting, face-to-face patient interaction, and documentation    Jey Wheeler MD          [1]   Family History  Problem Relation Name Age of Onset    Blood clot Mother Griselda Palm     Cancer Mother Griselda Palm     Diabetes Mother Griselda Palm     Genetic Testing Mother Griselda Palm     Blood clot Mother's Sister      Blood clot Mother's Sister Sisi Averjoel     Breast cancer Mother's Sister Sisi Averman     Hypertension Mother's Sister Mery Braxtonrakel     Paranoid behavior Mother's Sister Stephany Minor     Schizophrenia Mother's Sister Stephany Minor     Mental illness Mother's Sister Stephany Minor     Blood clot Mother's Sister Sisi Averman     Breast cancer Mother's Sister Sisi Averman     Hypertension Mother's Sister Mery Milanjat     Mental illness Mother's Sister Stephany Minor     Heart disease Mother's Brother Santana Arpita     Heart disease Mother's Brother Santana Arpita     Alcohol abuse Maternal Grandmother Deedee Palm     Hearing loss Maternal Grandmother Deedee Palm     Miscarriages / Stillbirths Maternal Grandmother Deedee Palm     Stroke Maternal Grandmother Deedee Davyjoel     Vision loss Maternal Grandmother Deedee Palm     Blood clot Maternal Grandfather Hai Arpita     Diabetes Maternal Grandfather Hai Arpita     Heart disease Maternal  Grandfather Hai Palm     Sleep apnea Maternal Grandfather Hai Palm     Diabetes Paternal Grandmother Eva Milan     Cancer Paternal Grandfather Stoney Milan

## 2025-06-02 ENCOUNTER — DOCUMENTATION (OUTPATIENT)
Dept: SURGERY | Facility: CLINIC | Age: 30
End: 2025-06-02
Payer: MEDICAID

## 2025-06-06 ENCOUNTER — TELEPHONE (OUTPATIENT)
Dept: PREADMISSION TESTING | Facility: HOSPITAL | Age: 30
End: 2025-06-06
Payer: MEDICAID

## 2025-06-06 NOTE — TELEPHONE ENCOUNTER
Pre-procedure PAT phone assessment completed. Pre-operative and medication instructions reviewed with patient. Instructed to hold all vitamins, supplements, and Ibuprofen until after surgery. Patient verbalizes understanding of instructions. Pt requests no narcotic during this hospital visit. Note put on chart. SURGERY PRE-OPERATIVE INSTRUCTIONS    *You will receive a phone call the day before your procedure  after 2pm, (or the Friday before your surgery if scheduled on a Monday.) Generally the hospital will be calling you with this information after that time.    *If you are taking GLP1 medications for type 2 diabetes or weight loss, hold these medications on the day of the procedure. START a clear liquid diet 24 hours before your surgery. On the day of your surgery/procedure, STOP all clear liquids 2 hours before your arrival time to the hospital. A clear liquid diet consists of clear liquids and foods that melt into a clear liquid. Clear liquids can and should contain sugar. This is only if you are taking a GLP1 medication.     *You are not to eat after midnight the night before the surgery. You may have up to 10 ounces of clear liquids up until 2 hours prior to arriving to the hospital. The exception is with medications you were instructed to take day of surgery.     *You may take tylenol for pain/discomfort as needed.     *Stop taking all aspirin products, ibuprofen (motrin/advil), naproxen (aleve/naprosyn) for one week prior to surgery.    *Stop taking all vitamins and supplements one week prior to surgery.     *You should not have alcoholic beverages for 24 hours before surgery.     *You should not smoke 24 hours prior to surgery.     *To help prevent surgical infections bathe/shower with Dial soap the evening before surgery.    *You can wear deodorant but no lotion, powder, or perfume/cologne. You should remove all make-up and nail polish at home.    *If you wear glasses, please bring a case for the glasses  with you.    *You will be asked to remove dentures and contacts.     *Please leave all valuables at home.    *You should wear loose, comfortable clothing that will accommodate bandages and/or casts.    *You should notify your doctor of any change in your condition (fever, cold, rash, etc). Surgery may need to be re-scheduled until a time you are in better health.    *A responsible adult is required to accompany you to and from the hospital if you are receiving anesthesia or a sedative. Patients are not permitted to drive for 24 hours after anesthesia.     *You can use the [employer] if you wish.     *If you have any further questions please call [phone].

## 2025-06-09 ENCOUNTER — ANESTHESIA EVENT (OUTPATIENT)
Dept: OPERATING ROOM | Facility: HOSPITAL | Age: 30
End: 2025-06-09
Payer: MEDICAID

## 2025-06-10 ENCOUNTER — HOSPITAL ENCOUNTER (OUTPATIENT)
Dept: OPERATING ROOM | Facility: HOSPITAL | Age: 30
Setting detail: OUTPATIENT SURGERY
Discharge: HOME | End: 2025-06-10
Payer: MEDICAID

## 2025-06-10 ENCOUNTER — ANESTHESIA (OUTPATIENT)
Dept: OPERATING ROOM | Facility: HOSPITAL | Age: 30
End: 2025-06-10
Payer: MEDICAID

## 2025-06-10 VITALS
HEIGHT: 64 IN | WEIGHT: 249.34 LBS | RESPIRATION RATE: 16 BRPM | SYSTOLIC BLOOD PRESSURE: 93 MMHG | DIASTOLIC BLOOD PRESSURE: 44 MMHG | OXYGEN SATURATION: 97 % | TEMPERATURE: 96.8 F | BODY MASS INDEX: 42.57 KG/M2 | HEART RATE: 56 BPM

## 2025-06-10 DIAGNOSIS — J45.20 MILD INTERMITTENT REACTIVE AIRWAY DISEASE WITHOUT COMPLICATION (HHS-HCC): ICD-10-CM

## 2025-06-10 DIAGNOSIS — J06.9 UPPER RESPIRATORY TRACT INFECTION, UNSPECIFIED TYPE: ICD-10-CM

## 2025-06-10 DIAGNOSIS — Z13.21 ENCOUNTER FOR VITAMIN DEFICIENCY SCREENING: ICD-10-CM

## 2025-06-10 DIAGNOSIS — G47.33 OSA (OBSTRUCTIVE SLEEP APNEA): Primary | ICD-10-CM

## 2025-06-10 DIAGNOSIS — F39 MOOD DISORDER: ICD-10-CM

## 2025-06-10 DIAGNOSIS — E66.813 CLASS 3 SEVERE OBESITY DUE TO EXCESS CALORIES WITH SERIOUS COMORBIDITY AND BODY MASS INDEX (BMI) OF 40.0 TO 44.9 IN ADULT: ICD-10-CM

## 2025-06-10 DIAGNOSIS — F44.5 PSYCHOGENIC NONEPILEPTIC SEIZURE: ICD-10-CM

## 2025-06-10 DIAGNOSIS — Z01.818 PREOPERATIVE CLEARANCE: ICD-10-CM

## 2025-06-10 LAB — PREGNANCY TEST URINE, POC: NEGATIVE

## 2025-06-10 PROCEDURE — 7100000010 HC PHASE TWO TIME - EACH INCREMENTAL 1 MINUTE: Performed by: ANESTHESIOLOGY

## 2025-06-10 PROCEDURE — A43239 PR EDG TRANSORAL BIOPSY SINGLE/MULTIPLE: Performed by: REGISTERED NURSE

## 2025-06-10 PROCEDURE — 3700000002 HC GENERAL ANESTHESIA TIME - EACH INCREMENTAL 1 MINUTE: Performed by: ANESTHESIOLOGY

## 2025-06-10 PROCEDURE — 2500000004 HC RX 250 GENERAL PHARMACY W/ HCPCS (ALT 636 FOR OP/ED): Performed by: ANESTHESIOLOGY

## 2025-06-10 PROCEDURE — 7100000009 HC PHASE TWO TIME - INITIAL BASE CHARGE: Performed by: ANESTHESIOLOGY

## 2025-06-10 PROCEDURE — 43239 EGD BIOPSY SINGLE/MULTIPLE: CPT | Performed by: SURGERY

## 2025-06-10 PROCEDURE — 81025 URINE PREGNANCY TEST: CPT | Performed by: ANESTHESIOLOGY

## 2025-06-10 PROCEDURE — 3600000002 HC OR TIME - INITIAL BASE CHARGE - PROCEDURE LEVEL TWO: Performed by: ANESTHESIOLOGY

## 2025-06-10 PROCEDURE — A43239 PR EDG TRANSORAL BIOPSY SINGLE/MULTIPLE: Performed by: ANESTHESIOLOGY

## 2025-06-10 PROCEDURE — 3700000001 HC GENERAL ANESTHESIA TIME - INITIAL BASE CHARGE: Performed by: ANESTHESIOLOGY

## 2025-06-10 PROCEDURE — 2720000007 HC OR 272 NO HCPCS: Performed by: ANESTHESIOLOGY

## 2025-06-10 PROCEDURE — 3600000007 HC OR TIME - EACH INCREMENTAL 1 MINUTE - PROCEDURE LEVEL TWO: Performed by: ANESTHESIOLOGY

## 2025-06-10 RX ORDER — PROPOFOL 10 MG/ML
INJECTION, EMULSION INTRAVENOUS AS NEEDED
Status: DISCONTINUED | OUTPATIENT
Start: 2025-06-10 | End: 2025-06-10

## 2025-06-10 RX ORDER — DROPERIDOL 2.5 MG/ML
0.62 INJECTION, SOLUTION INTRAMUSCULAR; INTRAVENOUS ONCE AS NEEDED
Status: DISCONTINUED | OUTPATIENT
Start: 2025-06-10 | End: 2025-06-11 | Stop reason: HOSPADM

## 2025-06-10 RX ORDER — FLUTICASONE PROPIONATE 50 MCG
1 SPRAY, SUSPENSION (ML) NASAL DAILY PRN
Start: 2025-06-10

## 2025-06-10 RX ORDER — ONDANSETRON HYDROCHLORIDE 2 MG/ML
4 INJECTION, SOLUTION INTRAVENOUS ONCE AS NEEDED
Status: DISCONTINUED | OUTPATIENT
Start: 2025-06-10 | End: 2025-06-11 | Stop reason: HOSPADM

## 2025-06-10 RX ORDER — MIDAZOLAM HYDROCHLORIDE 1 MG/ML
INJECTION, SOLUTION INTRAMUSCULAR; INTRAVENOUS AS NEEDED
Status: DISCONTINUED | OUTPATIENT
Start: 2025-06-10 | End: 2025-06-10

## 2025-06-10 RX ORDER — FENTANYL CITRATE 50 UG/ML
INJECTION, SOLUTION INTRAMUSCULAR; INTRAVENOUS AS NEEDED
Status: DISCONTINUED | OUTPATIENT
Start: 2025-06-10 | End: 2025-06-10

## 2025-06-10 RX ORDER — SODIUM CHLORIDE, SODIUM LACTATE, POTASSIUM CHLORIDE, CALCIUM CHLORIDE 600; 310; 30; 20 MG/100ML; MG/100ML; MG/100ML; MG/100ML
20 INJECTION, SOLUTION INTRAVENOUS CONTINUOUS
Status: DISCONTINUED | OUTPATIENT
Start: 2025-06-10 | End: 2025-06-11 | Stop reason: HOSPADM

## 2025-06-10 RX ADMIN — MIDAZOLAM 2 MG: 1 INJECTION INTRAMUSCULAR; INTRAVENOUS at 12:06

## 2025-06-10 RX ADMIN — PROPOFOL 150 MG: 10 INJECTION, EMULSION INTRAVENOUS at 12:08

## 2025-06-10 RX ADMIN — FENTANYL CITRATE 100 MCG: 50 INJECTION, SOLUTION INTRAMUSCULAR; INTRAVENOUS at 12:06

## 2025-06-10 RX ADMIN — PROPOFOL 50 MG: 10 INJECTION, EMULSION INTRAVENOUS at 12:14

## 2025-06-10 RX ADMIN — SODIUM CHLORIDE, POTASSIUM CHLORIDE, SODIUM LACTATE AND CALCIUM CHLORIDE 20 ML/HR: 600; 310; 30; 20 INJECTION, SOLUTION INTRAVENOUS at 11:37

## 2025-06-10 SDOH — HEALTH STABILITY: MENTAL HEALTH: CURRENT SMOKER: 0

## 2025-06-10 ASSESSMENT — PAIN SCALES - GENERAL
PAINLEVEL_OUTOF10: 0 - NO PAIN

## 2025-06-10 ASSESSMENT — COLUMBIA-SUICIDE SEVERITY RATING SCALE - C-SSRS
1. IN THE PAST MONTH, HAVE YOU WISHED YOU WERE DEAD OR WISHED YOU COULD GO TO SLEEP AND NOT WAKE UP?: NO
6. HAVE YOU EVER DONE ANYTHING, STARTED TO DO ANYTHING, OR PREPARED TO DO ANYTHING TO END YOUR LIFE?: NO
2. HAVE YOU ACTUALLY HAD ANY THOUGHTS OF KILLING YOURSELF?: NO

## 2025-06-10 ASSESSMENT — PAIN - FUNCTIONAL ASSESSMENT
PAIN_FUNCTIONAL_ASSESSMENT: 0-10

## 2025-06-10 NOTE — ANESTHESIA PREPROCEDURE EVALUATION
Patient: Sheron Mcmullen    Procedure Information       Date/Time: 06/10/25 1210    Scheduled providers: Roderick Alfaro MD; Alejo Castellanos MD    Procedure: EGD    Location: [facility] OR          Vitals:    06/10/25 1045   BP: (!) 138/99   Pulse: 65   Resp: 16   Temp: 36 °C (96.8 °F)   SpO2: 99%       Surgical History[1]  Medical History[2]  Current Medications[3]  Prior to Admission medications    Medication Sig Start Date End Date Taking? Authorizing Provider   albuterol (Ventolin HFA) 90 mcg/actuation inhaler Inhale 2 puffs every 4 hours if needed for wheezing or shortness of breath. 2/28/24  Yes Joceline Lambert MD   albuterol 2.5 mg /3 mL (0.083 %) nebulizer solution Take 3 mL (2.5 mg) by nebulization every 4 hours if needed for wheezing or shortness of breath. 2/28/24  Yes Joceline Lambert MD   busPIRone (Buspar) 10 mg tablet Take 1 tablet (10 mg) by mouth every 12 hours. 3/13/25  Yes Jasmina Patel MD   cetirizine-pseudoephedrine (ZyrTEC-D) 5-120 mg 12 hr tablet Take 1 tablet by mouth 2 times a day as needed for allergies. 12/18/24  Yes Joceline Lambert MD   ergocalciferol (DrisdoL) 1250 mcg (50,000 units) capsule Take 1 capsule (1.25 mg) by mouth 2 times a week for 16 doses. Take 1 Capsule Twice Weekly By Mouth for 8 Weeks d/t Lab Resulted Vitam Deficiency (<20). 5/22/25 7/15/25 Yes Roderick Alfaro MD   fluticasone (Flonase) 50 mcg/actuation nasal spray Administer 1 spray into each nostril once daily. Shake gently. Before first use, prime pump. After use, clean tip and replace cap.  Patient taking differently: Administer 1 spray into each nostril once daily as needed for allergies. Shake gently. Before first use, prime pump. After use, clean tip and replace cap. 12/18/24 12/18/25 Yes Joceline Lambert MD   folic acid (Folvite) 400 mcg tablet Take 1 tablet (0.4 mg) by mouth once daily. 5/21/25 8/19/25 Yes Roderick Alfaro MD   ibuprofen 800 mg tablet Take 1 tablet (800 mg) by mouth  3 times a day as needed for mild pain (1 - 3) (pain). 24  Yes Joceline Lambert MD   prazosin (Minipress) 1 mg capsule Take 1 capsule (1 mg) by mouth once daily at bedtime. 25 Yes Jasmina Patel MD   sertraline (Zoloft) 50 mg tablet Take 1 tablet (50 mg) by mouth once daily. 3/13/25 3/13/26 Yes Jasmina Patel MD   norethindrone-e.estradioL-iron (Junel FE 1/20, 28,) 1 mg-20 mcg (21)/75 mg (7) tablet Take 1 tablet by mouth once daily.  Patient not taking: No sig reported  6/10/25  Historical Provider, MD     RX Allergies[4]  Social History     Tobacco Use    Smoking status: Former     Current packs/day: 0.00     Types: Cigarettes     Quit date:      Years since quittin.4    Smokeless tobacco: Never    Tobacco comments:     25: Quit in 23 patient verbalized  RN    Substance Use Topics    Alcohol use: Yes     Comment: socially         Chemistry    Lab Results   Component Value Date/Time     05/10/2025 0907    K 4.2 05/10/2025 0907     05/10/2025 0907    CO2 23 05/10/2025 0907    BUN 12 05/10/2025 0907    CREATININE 0.55 05/10/2025 0907    Lab Results   Component Value Date/Time    CALCIUM 8.9 05/10/2025 0907    ALKPHOS 71 05/10/2025 0907    AST 14 05/10/2025 0907    ALT 18 05/10/2025 0907    BILITOT 0.3 05/10/2025 0907          Lab Results   Component Value Date/Time    WBC 8.2 05/10/2025 0907    HGB 13.4 05/10/2025 0907    HCT 41.5 05/10/2025 0907     05/10/2025 0907     Lab Results   Component Value Date/Time    PROTIME 10.0 05/10/2025 0907    INR 0.9 05/10/2025 0907     Encounter Date: 25   ECG 12 lead (Clinic Performed)    Narrative    sinus rhythm at 62 bpm, normal AV conduction, and normal ventricular   repolarization.     No results found for this or any previous visit from the past 1095 days.    Relevant Problems   Pulmonary   (+) ALBINA (obstructive sleep apnea)      Neuro   (+) Dissociative convulsions   (+) Seizure (Multi)      Endocrine   (+)  Class 3 severe obesity due to excess calories with serious comorbidity and body mass index (BMI) of 40.0 to 44.9 in adult      Skin   (+) Atopic dermatitis       Clinical information reviewed:   Tobacco  Allergies  Meds   Med Hx  Surg Hx  OB Status  Fam Hx  Soc   Hx        NPO Detail:  NPO/Void Status  Date of Last Liquid: 06/09/25  Date of Last Solid: 06/09/25         Physical Exam    Airway  Mallampati: II     Cardiovascular - normal exam   Dental    Pulmonary    Abdominal            Anesthesia Plan    History of general anesthesia?: yes  History of complications of general anesthesia?: no    ASA 3     MAC     The patient is not a current smoker.  Patient was not previously instructed to abstain from smoking on day of procedure.  Patient did not smoke on day of procedure.  Education provided regarding risk of obstructive sleep apnea.  intravenous induction   Anesthetic plan and risks discussed with patient.    Plan discussed with CRNA.           [1]   Past Surgical History:  Procedure Laterality Date    COLONOSCOPY      ESOPHAGOGASTRODUODENOSCOPY      LEG SURGERY Right     2021- lesion removed and partial bone removal.    OTHER SURGICAL HISTORY  10/30/2020    Tonsillectomy    UPPER GASTROINTESTINAL ENDOSCOPY     [2]   Past Medical History:  Diagnosis Date    Acute respiratory distress     Respiratory distress    Addiction to drug (Multi)     prescription narcotics   age 16    ADHD (attention deficit hyperactivity disorder)     Adverse effect of anesthesia     body paralysis after EGD/Colonoscopy years ago    Agoraphobia     Allergy status to unspecified drugs, medicaments and biological substances     History of seasonal allergies    Anxiety     Bariatric surgery status     Bipolar disorder     Class 3 severe obesity with body mass index (BMI) of 40.0 to 44.9 in adult 04/30/2025    Depression, unspecified 12/21/2013    Depression    Morbid obesity (Multi)     Obsessive-compulsive disorder     Other  specified respiratory disorders     Viral respiratory infection    Panic attack     Panic disorder     Personal history of other specified conditions     History of chest pain    Personal history of other specified conditions     History of wheezing    Personal history of pneumonia (recurrent)     History of pneumonia    PTSD (post-traumatic stress disorder)     Reactive airway disease ([facility])     Seizures (Multi)     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    Suicide attempt (Multi)     Unspecified asthma, uncomplicated ([facility])     RAD (reactive airway disease)    Wears glasses    [3]   Current Outpatient Medications:     albuterol (Ventolin HFA) 90 mcg/actuation inhaler, Inhale 2 puffs every 4 hours if needed for wheezing or shortness of breath., Disp: 18 g, Rfl: 3    albuterol 2.5 mg /3 mL (0.083 %) nebulizer solution, Take 3 mL (2.5 mg) by nebulization every 4 hours if needed for wheezing or shortness of breath., Disp: 75 mL, Rfl: 3    busPIRone (Buspar) 10 mg tablet, Take 1 tablet (10 mg) by mouth every 12 hours., Disp: 60 tablet, Rfl: 2    cetirizine-pseudoephedrine (ZyrTEC-D) 5-120 mg 12 hr tablet, Take 1 tablet by mouth 2 times a day as needed for allergies., Disp: 14 tablet, Rfl: 0    ergocalciferol (DrisdoL) 1250 mcg (50,000 units) capsule, Take 1 capsule (1.25 mg) by mouth 2 times a week for 16 doses. Take 1 Capsule Twice Weekly By Mouth for 8 Weeks d/t Lab Resulted Vitam Deficiency (<20)., Disp: 16 capsule, Rfl: 0    fluticasone (Flonase) 50 mcg/actuation nasal spray, Administer 1 spray into each nostril once daily. Shake gently. Before first use, prime pump. After use, clean tip and replace cap. (Patient taking differently: Administer 1 spray into each nostril once daily as needed for allergies. Shake gently. Before first use, prime pump. After use, clean tip and replace cap.), Disp: 16 g, Rfl: 0    folic acid (Folvite) 400 mcg tablet, Take 1 tablet (0.4 mg) by mouth once daily., Disp: 30  tablet, Rfl: 2    ibuprofen 800 mg tablet, Take 1 tablet (800 mg) by mouth 3 times a day as needed for mild pain (1 - 3) (pain)., Disp: 90 tablet, Rfl: 3    prazosin (Minipress) 1 mg capsule, Take 1 capsule (1 mg) by mouth once daily at bedtime., Disp: 30 capsule, Rfl: 3    sertraline (Zoloft) 50 mg tablet, Take 1 tablet (50 mg) by mouth once daily., Disp: 90 tablet, Rfl: 1    Current Facility-Administered Medications:     lactated Ringer's infusion, 20 mL/hr, intravenous, Continuous, Alejo Castellanos MD  [4]   Allergies  Allergen Reactions    Metformin Other     Abd.pain/cramps, multiple episodes of diarrhea per day     Animal Dander Swelling    Cat Dander Swelling    Dog Dander Swelling    Milk Swelling    Penicillin G Hives    Pollen Extracts Swelling    Soy Diarrhea    Soybean Hives

## 2025-06-10 NOTE — INTERVAL H&P NOTE
H&P reviewed. The patient was examined and there are no changes to the H&P. Patient seen in preoperative area, no changes since last seen by Dr. Alfaro. Procedure discussed and informed consent obtained.    Petty Mendoza MD  General Surgery

## 2025-06-10 NOTE — ANESTHESIA POSTPROCEDURE EVALUATION
Patient: Sheron Mcmullen    Procedure Summary       Date: 06/10/25 Room / Location: [facility] OR    Anesthesia Start: 1204 Anesthesia Stop: 1224    Procedure: EGD Diagnosis:       Class 3 severe obesity due to excess calories with serious comorbidity and body mass index (BMI) of 40.0 to 44.9 in adult      Psychogenic nonepileptic seizure      Mild intermittent reactive airway disease without complication ([facility])      Encounter for vitamin deficiency screening      Preoperative clearance      Mood disorder    Scheduled Providers: Roderick Alfaro MD; Alejo Castellanos MD Responsible Provider: Alejo Castellanos MD    Anesthesia Type: MAC ASA Status: 3            Anesthesia Type: MAC    Vitals Value Taken Time   BP See vitals 06/10/25 12:24   Temp  06/10/25 12:24   Pulse  06/10/25 12:24   Resp  06/10/25 12:24   SpO2  06/10/25 12:24       Anesthesia Post Evaluation    Patient location during evaluation: PACU  Patient participation: complete - patient participated  Level of consciousness: awake  Pain management: adequate  Multimodal analgesia pain management approach  Airway patency: patent  Two or more strategies used to mitigate risk of obstructive sleep apnea  Cardiovascular status: acceptable  Respiratory status: acceptable  Hydration status: acceptable  Postoperative Nausea and Vomiting: none        No notable events documented.

## 2025-06-11 ENCOUNTER — APPOINTMENT (OUTPATIENT)
Dept: PULMONOLOGY | Facility: HOSPITAL | Age: 30
End: 2025-06-11
Payer: MEDICAID

## 2025-06-23 LAB
LABORATORY COMMENT REPORT: NORMAL
PATH REPORT.FINAL DX SPEC: NORMAL
PATH REPORT.GROSS SPEC: NORMAL
PATH REPORT.TOTAL CANCER: NORMAL

## 2025-06-24 ENCOUNTER — APPOINTMENT (OUTPATIENT)
Dept: BEHAVIORAL HEALTH | Facility: CLINIC | Age: 30
End: 2025-06-24
Payer: MEDICAID

## 2025-06-30 ENCOUNTER — APPOINTMENT (OUTPATIENT)
Dept: SURGERY | Facility: CLINIC | Age: 30
End: 2025-06-30
Payer: MEDICAID

## 2025-06-30 NOTE — TELEPHONE ENCOUNTER
Griselda (pts mother) called in to schedule a bariatric surgical clearance apt with us. She asked that we take note of Sheron's severe anxiety. She is in recovery from Agoraphobia and is active in exposure therapy. When under stress from anxiety she experiences non-epileptic seizures.     She has a therapy dog that has been instrumental in her recovery and she needs to have either her dog or her mother present for outings. Griselda thinks that Sheron would sleep best at home if she can do an in-home study or to be allowed to bring her dog to the sleep lab.     Her virtual apt is next week.   Please refer to 6/30/25 prep for case

## 2025-06-30 NOTE — PROGRESS NOTES
Bariatric Surgery Program -  [place]    PREOPERATIVE, MULTIDISCIPLINARY, MEDICALLY SUPERVISED, REDUCED CALORIE DIET, BEHAVIOR MODIFICATION AND EXERCISE PROGRAM    Date:2025 Patient Name: Sheron Mcmullen Patient : 1995    Subjective :  The pt has been feeling good about staying on track with her goals. Noted that her last RD appointment weight was incorrect and she has been maintaining around 248lbs.  Has been meal prepping. Has replaced her pasta with high protein pasta (Banza) and makes she is portioning out her meals. Continues to eat slowly. The pt feels confident with making good foods choices when she goes out to eat. Struggling a little with the 30s minutes after of no drinking. Will have 1 sip after her meal.  Has been drinking water and SF packets and has been getting 80oz daily. Has been taking folic acid daily and vitamin D 2x per week.     B: 3-4 eggs w/hashbrown or Premier protein w/decaf coffee and skinny mix   L: Oikos (20g) w/corinna crunch (11g) w/fruit  D: Meat (4oz), Banza pasta and veggies   S: rarely    Objective :  Wt:   Wt Readings from Last 1 Encounters:   06/10/25 113 kg (249 lb 5.4 oz)        Ht:      BMI: There is no height or weight on file to calculate BMI.    Dietary recommendation:   1. Continue to drink 64oz of noncarbonated/caffeine free/sugar free beverages   2. Practice the 30-30-30 rule by drinking between meals.  3. Structure your meal plan - have 3 meals and 1 snack daily.  4. Have balanced meals that always contain a good source of protein.  5. Increase intake of non-starchy vegetables.  Have 5 servings fruits and vegetables daily.   6. Take a multivitamin daily.  7. Continue your exercise routine. Increase physical activity by 10-15 minutes to an end goal of 60 minutes 5 x per week.    Group Topic: Dining Out    Behavioral recommendation: Pt is encouraged to practice making healthy choices and controling portion sizes while dining  out.    Assessment/Plan :  Pt appears to have a good understanding of how to choose menu options at restaurants that are appropriate for the weight loss surgery patient.  Ms. Mcmullen has a goal to access nutritional information on menu items before making selections, make healthy choices based off cooking style when nutrition information is not available, avoid consuming excess hidden calories in restaurant meals and beverages, and control portion sizes while dining out. The pt continues to do well. Will follow up in 2 months to check in.       Goal: 5% body weight loss over the course of program      Exercise: daily resistance (resistance bands and 2lbs weight) exercises for 35 minutes       Mahnaz Anderson MS, RD, LD  Phone: [phone]

## 2025-07-01 ENCOUNTER — APPOINTMENT (OUTPATIENT)
Dept: BEHAVIORAL HEALTH | Facility: CLINIC | Age: 30
End: 2025-07-01
Payer: MEDICAID

## 2025-07-01 DIAGNOSIS — F44.5 PSYCHOGENIC NONEPILEPTIC SEIZURE: ICD-10-CM

## 2025-07-01 DIAGNOSIS — F43.10 PTSD (POST-TRAUMATIC STRESS DISORDER): ICD-10-CM

## 2025-07-01 DIAGNOSIS — F41.8 OTHER SPECIFIED ANXIETY DISORDERS: ICD-10-CM

## 2025-07-01 DIAGNOSIS — Z01.818 PREOPERATIVE CLEARANCE: ICD-10-CM

## 2025-07-01 DIAGNOSIS — Z98.84 BARIATRIC SURGERY STATUS: ICD-10-CM

## 2025-07-01 DIAGNOSIS — E53.8 FOLIC ACID DEFICIENCY: ICD-10-CM

## 2025-07-01 DIAGNOSIS — F32.89 OTHER DEPRESSION: ICD-10-CM

## 2025-07-01 DIAGNOSIS — Z98.84 HISTORY OF BARIATRIC SURGERY: ICD-10-CM

## 2025-07-01 PROCEDURE — 99212 OFFICE O/P EST SF 10 MIN: CPT | Performed by: PSYCHIATRY & NEUROLOGY

## 2025-07-01 PROCEDURE — 1036F TOBACCO NON-USER: CPT | Performed by: PSYCHIATRY & NEUROLOGY

## 2025-07-01 RX ORDER — SERTRALINE HYDROCHLORIDE 50 MG/1
50 TABLET, FILM COATED ORAL DAILY
Qty: 90 TABLET | Refills: 1 | Status: SHIPPED | OUTPATIENT
Start: 2025-07-01 | End: 2026-07-01

## 2025-07-01 RX ORDER — BUSPIRONE HYDROCHLORIDE 10 MG/1
10 TABLET ORAL EVERY 12 HOURS
Qty: 180 TABLET | Refills: 1 | Status: SHIPPED | OUTPATIENT
Start: 2025-07-01

## 2025-07-01 RX ORDER — PRAZOSIN HYDROCHLORIDE 1 MG/1
1 CAPSULE ORAL NIGHTLY
Qty: 180 CAPSULE | Refills: 1 | Status: SHIPPED | OUTPATIENT
Start: 2025-07-01 | End: 2026-07-01

## 2025-07-01 NOTE — PROGRESS NOTES
"Outpatient Psychiatry Follow up visit    Ms. Sheron Ortiz is a 29-year-old woman with a history of depression, anxiety, PTSD, psychogenic nonepileptic seizures. Follow up done virtually today. Not able to join video it.     Virtual or Telephone Consent    While technically available, the patient was unable or unwilling to consent to connect via audio/video telehealth technology; therefore, I performed this visit using a real-time audio only connection between Sheron Mcmullen & Jasmina Patel MD.  Verbal consent was requested and obtained from Sheron Mcmullen on this date, 07/01/25 for a telehealth visit and the patient's location was confirmed at the time of the visit.    Subjective:      She says she has been feeling \"good.\"    She says she has not had a seizure in about a month. She says that her emotional support dog has helped a lot. She says that focusing on the dog instead of the \"outside world\" has helped. She says that she has a hard time going outside but has to do so in order to take care of the dog. She takes her dog for training. She says she does well with routine, and this helps with \"agoraphobia and OCD.\" She says she protects herself from high-stress situations.     She says she has had two panic attacks recently.     She says starting prazosin has helped decrease her \"night terrors.\"     She says she is on vitamin D and folic acid supplementation. She says she is awaiting bariatric surgery. She says she has lost 15 lbs already. Appetite is okay. Working with nutritionist through bariatric program.      Sleep - \"okay.\" Says she did sleep test and was diagnosed with sleep apnea; she is working to get her CPAP. She says that prazosin is helping; has not had \"night fright\" but does not remember her dreams.     She says she always battles with the thoughts of self-harm but is better able to manage.   She denies suicidal thoughts, intent or plans.      She says she has not had any " "manic episodes recently; she says sometimes other people notice that she is more \"happy\" and wants to do \"everything.\" Denies any sustained periods of decreased need for sleep, increased activity.      PTSD - says night terrors have decreased significantly since starting prazosin.     She says she has heard \"3 voices\" \"all day every day\" since she was 9. She says this is at baseline and has not had any increase or new.      She says she has an emotional support dog who is in service dog training.     She also sees a therapist Caio Malagon at [facility]. She says they are working on stress management.      She is on Metformin - helps with PCOS.     Does not smoke cigarettes.   Does not drink alcohol   Says she smokes marijuana every day for her stress, anxiety.      Current medications:   Buspirone 10mg twice daily  Sertraline 50mg daily  Prazosin 1mg at bedtime    Past medications:   Lithium - caused stomach problems, weight gain  Vistaril   Vyvanse  Melatonin  Abilify - weight gain     Psychiatric Review Of Systems:     As above.    Medical Review Of Systems:    Pertinent items are noted in HPI.    Record Review: brief     Mental Status Evaluation:  Behavior/Attitude: Cooperative. Pleasant.   Motor: Psychomotor activity in average range. No abnormal involuntary movements.   Speech: Regular in rate, tone and volume. No pressure.  Mood: \"good.\"  Thought process: Goal-directed. Linear. Organized.  Thought content: No paranoia, delusion or ideas of reference elicited.  Reports chronic AH - at baseline.   Suicidal thoughts: reports chronic intermittent self-harming thoughts but denies any recent self-harming behaviors or any suicidal ideation, intent or plans.   Homicidal ideation: denied.   Insight: Fair.  Judgment: Fair.  Recent and remote memory: intact based on recall of recent and remote autobiographical memories.  Attention/concentration: intact.  Language: No aphasia or paraphasic errors.   Fund of " knowledge: Average    PMH/PSH:  Past Medical History:   Diagnosis Date    Acute respiratory distress     Respiratory distress    Addiction to drug (Multi)     prescription narcotics   age 16    ADHD (attention deficit hyperactivity disorder)     Adverse effect of anesthesia     body paralysis after EGD/Colonoscopy years ago    Agoraphobia     Allergy status to unspecified drugs, medicaments and biological substances     History of seasonal allergies    Anxiety     Bariatric surgery status     Bipolar disorder     Class 3 severe obesity with body mass index (BMI) of 40.0 to 44.9 in adult 04/30/2025    Depression, unspecified 12/21/2013    Depression    Morbid obesity (Multi)     Obsessive-compulsive disorder     Other specified respiratory disorders     Viral respiratory infection    Panic attack     Panic disorder     Personal history of other specified conditions     History of chest pain    Personal history of other specified conditions     History of wheezing    Personal history of pneumonia (recurrent)     History of pneumonia    PTSD (post-traumatic stress disorder)     Reactive airway disease ([facility])     Seizures (Multi)     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    Suicide attempt (Multi)     Unspecified asthma, uncomplicated ([facility])     RAD (reactive airway disease)    Wears glasses         Meds  Current Outpatient Medications on File Prior to Visit   Medication Sig Dispense Refill    albuterol (Ventolin HFA) 90 mcg/actuation inhaler Inhale 2 puffs every 4 hours if needed for wheezing or shortness of breath. 18 g 3    albuterol 2.5 mg /3 mL (0.083 %) nebulizer solution Take 3 mL (2.5 mg) by nebulization every 4 hours if needed for wheezing or shortness of breath. 75 mL 3    busPIRone (Buspar) 10 mg tablet Take 1 tablet (10 mg) by mouth every 12 hours. 60 tablet 2    cetirizine-pseudoephedrine (ZyrTEC-D) 5-120 mg 12 hr tablet Take 1 tablet by mouth 2 times a day as needed for allergies. 14 tablet  0    ergocalciferol (DrisdoL) 1250 mcg (50,000 units) capsule Take 1 capsule (1.25 mg) by mouth 2 times a week for 16 doses. Take 1 Capsule Twice Weekly By Mouth for 8 Weeks d/t Lab Resulted Vitam Deficiency (<20). 16 capsule 0    fluticasone (Flonase) 50 mcg/actuation nasal spray Administer 1 spray into each nostril once daily as needed for allergies. Shake gently. Before first use, prime pump. After use, clean tip and replace cap.      folic acid (Folvite) 400 mcg tablet Take 1 tablet (0.4 mg) by mouth once daily. 30 tablet 2    ibuprofen 800 mg tablet Take 1 tablet (800 mg) by mouth 3 times a day as needed for mild pain (1 - 3) (pain). 90 tablet 3    prazosin (Minipress) 1 mg capsule Take 1 capsule (1 mg) by mouth once daily at bedtime. 30 capsule 3    sertraline (Zoloft) 50 mg tablet Take 1 tablet (50 mg) by mouth once daily. 90 tablet 1     No current facility-administered medications on file prior to visit.        Allergies:   Allergies   Allergen Reactions    Metformin Other     Abd.pain/cramps, multiple episodes of diarrhea per day     Animal Dander Swelling    Cat Dander Swelling    Dog Dander Swelling    Milk Swelling    Penicillin G Hives    Pollen Extracts Swelling    Soy Diarrhea    Soybean Hives     Assessment/Plan   Assessment:     Ms. Sheron Ortiz is a 29-year-old woman with a history of depression, anxiety, PTSD, psychogenic nonepileptic seizures. Follow up done virtually (audio only) today.      7/1/25 - Reports improved mood and anxiety on current regimen (sertraline 50mg/day and buspirone 10mg/day), decreased night terrors on prazosin.   Reports being seizure-free for >1 month.   Undergoing bariatric program.   Has an emotional support animal, which has been very helpful.    Smokes marijuana daily.      Diagnosis:   Anxiety disorder (mixed generalized anxiety and panic disorder)  Psychogenic nonepileptic seizures  PTSD  Mood disorder - does not meet criteria for bipolar type I or II but  does report brief periods of mood upswings.   R/O Mood disorder w/ psychosis vs schizoaffective disorder  ?OCD    Treatment Plan/Recommendations:   - Continue prazosin 1mg at bedtime.   - Continue sertraline 50mg daily.   - Continue buspirone 10mg twice daily.   - Supportive therapy provided.   - Continue psychotherapy.   - Continue to monitor for possible psychotic symptoms - does not appear to be psychotic; likely related to PTSD.   - Follow up in 3 months.     Review with patient: Treatment plan reviewed with the patient.  Medication risks/benefit reviewed with the patient    Time spent on phone with patient: 14 minutes.     Jasmina Patel MD.

## 2025-07-07 RX ORDER — FOLIC ACID 0.4 MG
400 TABLET ORAL DAILY
Qty: 30 TABLET | Refills: 2 | Status: SHIPPED | OUTPATIENT
Start: 2025-07-07 | End: 2025-10-05

## 2025-07-16 ENCOUNTER — APPOINTMENT (OUTPATIENT)
Dept: BEHAVIORAL HEALTH | Facility: CLINIC | Age: 30
End: 2025-07-16
Payer: MEDICAID

## 2025-07-16 DIAGNOSIS — F44.5 PSYCHOGENIC NONEPILEPTIC SEIZURE: ICD-10-CM

## 2025-07-16 DIAGNOSIS — F43.10 PTSD (POST-TRAUMATIC STRESS DISORDER): ICD-10-CM

## 2025-07-16 DIAGNOSIS — F32.89 OTHER DEPRESSION: ICD-10-CM

## 2025-07-16 PROCEDURE — 90837 PSYTX W PT 60 MINUTES: CPT | Performed by: PSYCHOLOGIST

## 2025-07-21 NOTE — PROGRESS NOTES
Verbal consent was requested and obtained from patient on this date for a telehealth visit.  The telehealth session connected with the patient at her home.    Start time: 5:00 PM  End time: 5:55 PM    Sheron is a 29-year-old woman with PNES referred by Dr. Patel who returns for cognitive behavioral psychotherapy. She has been experiencing episodes since .  Triggers seem to be stress, anxiety, driving at night and exposure to bright lights. She has described the episodes as whole body tremors with fists clenching, sometimes leading to, or consequence of, panic. She recovers by sleeping.     We are continuing cognitive behavioral effort to work on exposure to anxiety-provoking stimuli and an exposure plus response prevention model for dealing with agoraphobia from a cognitive behavioral perspective.  In addition, she has expressed interest in weight loss and bariatric surgery.    Our session addressed several issues, includin.  Our goals for her include:  A.  Address agoraphobia and her strategies of fighting through it with walking and taking trips out of state  B.  Pursue grief counseling following the death of her stepmother last year.    C.  Maintain medications as prescribed by Dr. Patel  D.  Continue to see a therapist every other week at [facility] counseling to review everyday life, anger, repeated regression and OCD.  E.  Self monitor episodes.  F.  Pursue her weight loss goals using both habit change and possibly bariatric surgery in the future.    2.  She has been meeting with a nutritionist and has been losing weight, meeting her protein goal of 60 g daily, working out daily and embracing healthier eating habits.  To qualify for surgery she has got to stop using marijuana which she says are helpful to her in dealing with her anxiety when past trauma memories are challenging for her to deal with and particularly when those memories interfere with her sleep.  She also notes that  "auditory hallucinations are suppressed with the use of weed.  She will continue preparation for bariatric surgery and we will do a formal psychological evaluation and possible support of that surgery as she progresses through the pre-surgical workup.    3.  Her episodes continue to be much less frequent, occurring roughly monthly now.  She continues to have an increased likelihood when she is in high stress situation.  She particularly fears getting lost in a crowd, experiencing \"mass hysteria,\" and particularly cites the struggles she has being in an large open field such as she might experience where she had to attend a concert at [employer].  We discussed both strategies to address her agoraphobia, particularly in the form of exposure and response prevention, and working to limit her checking rituals.    4.  At our last session, she detailed her auditory hallucinations which are somewhat suppressed with her use of pot.  Those hallucinations began when she was 9 years old and in part it was the antipsychotic regimen that her mother insisted upon that prompted weight gain according to her.  She quit antipsychotics at age 19 and has been working to self-manage her hallucinations since that time.    5.  Some psychotherapy content is not included in this note to protect patient confidentiality.    6.  She will contact our office to schedule a follow-up appointment.      "

## 2025-07-25 ENCOUNTER — TELEPHONE (OUTPATIENT)
Dept: HEMATOLOGY/ONCOLOGY | Facility: CLINIC | Age: 30
End: 2025-07-25
Payer: MEDICAID

## 2025-07-25 NOTE — TELEPHONE ENCOUNTER
Returned call to Griselda and have explained to her that these are genetic tests for blood clotting. She dose not have lupus-she just had to put a diagnosis to get tests covered by her insurance.Griselda verbalized understanding and agreement regarding discussed information via verbal feedback.

## 2025-08-01 ENCOUNTER — OFFICE VISIT (OUTPATIENT)
Dept: HEMATOLOGY/ONCOLOGY | Facility: CLINIC | Age: 30
End: 2025-08-01
Payer: MEDICAID

## 2025-08-01 DIAGNOSIS — D68.62 LUPUS ANTICOAGULANT DISORDER (MULTI): Primary | ICD-10-CM

## 2025-08-09 ENCOUNTER — LAB (OUTPATIENT)
Dept: LAB | Facility: HOSPITAL | Age: 30
End: 2025-08-09
Payer: MEDICAID

## 2025-08-09 DIAGNOSIS — D68.62 LUPUS ANTICOAGULANT SYNDROME (MULTI): Primary | ICD-10-CM

## 2025-08-09 LAB
ALBUMIN SERPL BCP-MCNC: 4 G/DL (ref 3.4–5)
ALP SERPL-CCNC: 61 U/L (ref 33–110)
ALT SERPL W P-5'-P-CCNC: 11 U/L (ref 7–45)
ANION GAP SERPL CALCULATED.3IONS-SCNC: 10 MMOL/L (ref 10–20)
AST SERPL W P-5'-P-CCNC: 10 U/L (ref 9–39)
BASOPHILS # BLD AUTO: 0.04 X10*3/UL (ref 0–0.1)
BASOPHILS NFR BLD AUTO: 0.5 %
BILIRUB SERPL-MCNC: 0.3 MG/DL (ref 0–1.2)
BUN SERPL-MCNC: 14 MG/DL (ref 6–23)
CALCIUM SERPL-MCNC: 9.2 MG/DL (ref 8.6–10.3)
CHLORIDE SERPL-SCNC: 107 MMOL/L (ref 98–107)
CO2 SERPL-SCNC: 26 MMOL/L (ref 21–32)
CREAT SERPL-MCNC: 0.7 MG/DL (ref 0.5–1.05)
EGFRCR SERPLBLD CKD-EPI 2021: >90 ML/MIN/1.73M*2
EOSINOPHIL # BLD AUTO: 0.25 X10*3/UL (ref 0–0.7)
EOSINOPHIL NFR BLD AUTO: 3.2 %
ERYTHROCYTE [DISTWIDTH] IN BLOOD BY AUTOMATED COUNT: 13.4 % (ref 11.5–14.5)
FERRITIN SERPL-MCNC: 21 NG/ML (ref 8–150)
FOLATE SERPL-MCNC: 13.9 NG/ML
GLUCOSE SERPL-MCNC: 89 MG/DL (ref 74–99)
HCT VFR BLD AUTO: 37.5 % (ref 36–46)
HGB BLD-MCNC: 12.5 G/DL (ref 12–16)
HOLD SPECIMEN: NORMAL
IMM GRANULOCYTES # BLD AUTO: 0.02 X10*3/UL (ref 0–0.7)
IMM GRANULOCYTES NFR BLD AUTO: 0.3 % (ref 0–0.9)
IRON SATN MFR SERPL: 16 % (ref 25–45)
IRON SERPL-MCNC: 62 UG/DL (ref 35–150)
LYMPHOCYTES # BLD AUTO: 2.7 X10*3/UL (ref 1.2–4.8)
LYMPHOCYTES NFR BLD AUTO: 34.8 %
MCH RBC QN AUTO: 29.1 PG (ref 26–34)
MCHC RBC AUTO-ENTMCNC: 33.3 G/DL (ref 32–36)
MCV RBC AUTO: 87 FL (ref 80–100)
MONOCYTES # BLD AUTO: 0.44 X10*3/UL (ref 0.1–1)
MONOCYTES NFR BLD AUTO: 5.7 %
NEUTROPHILS # BLD AUTO: 4.31 X10*3/UL (ref 1.2–7.7)
NEUTROPHILS NFR BLD AUTO: 55.5 %
NRBC BLD-RTO: 0 /100 WBCS (ref 0–0)
PLATELET # BLD AUTO: 182 X10*3/UL (ref 150–450)
POTASSIUM SERPL-SCNC: 4.2 MMOL/L (ref 3.5–5.3)
PROT SERPL-MCNC: 6.6 G/DL (ref 6.4–8.2)
RBC # BLD AUTO: 4.29 X10*6/UL (ref 4–5.2)
SODIUM SERPL-SCNC: 139 MMOL/L (ref 136–145)
TIBC SERPL-MCNC: 387 UG/DL (ref 240–445)
UIBC SERPL-MCNC: 325 UG/DL (ref 110–370)
WBC # BLD AUTO: 7.8 X10*3/UL (ref 4.4–11.3)

## 2025-08-09 PROCEDURE — 86147 CARDIOLIPIN ANTIBODY EA IG: CPT

## 2025-08-09 PROCEDURE — 80053 COMPREHEN METABOLIC PANEL: CPT

## 2025-08-09 PROCEDURE — 36415 COLL VENOUS BLD VENIPUNCTURE: CPT

## 2025-08-09 PROCEDURE — 82728 ASSAY OF FERRITIN: CPT

## 2025-08-09 PROCEDURE — 85025 COMPLETE CBC W/AUTO DIFF WBC: CPT

## 2025-08-09 PROCEDURE — 83550 IRON BINDING TEST: CPT

## 2025-08-09 PROCEDURE — 85301 ANTITHROMBIN III ANTIGEN: CPT

## 2025-08-09 PROCEDURE — 82746 ASSAY OF FOLIC ACID SERUM: CPT

## 2025-08-09 PROCEDURE — 83540 ASSAY OF IRON: CPT

## 2025-08-09 PROCEDURE — 86146 BETA-2 GLYCOPROTEIN ANTIBODY: CPT

## 2025-08-10 LAB
B2 GLYCOPROT1 IGA SER-ACNC: 0.8 U/ML
B2 GLYCOPROT1 IGG SER-ACNC: <1.4 U/ML
B2 GLYCOPROT1 IGM SER-ACNC: 1 U/ML
CARDIOLIPIN IGA SERPL-ACNC: <0.5 APL U/ML
CARDIOLIPIN IGG SER IA-ACNC: <1.6 GPL U/ML
CARDIOLIPIN IGM SER IA-ACNC: 1.1 MPL U/ML

## 2025-08-11 LAB
DRVVT SCREEN TO CONFIRM RATIO: 0.97 RATIO
DRVVT/DRVVT CFM NRMLZD PPP-RTO: 1.01 RATIO
DRVVT/DRVVT CFM P DOAC NEUT NORM PPP-RTO: 0.97 RATIO
LA 2 SCREEN W REFLEX-IMP: NORMAL
NORMALIZED SCT PPP-RTO: 0.85 RATIO
SILICA CLOTTING TIME CONFIRMATION: 1.19 RATIO
SILICA CLOTTING TIME SCREEN: 1.01 RATIO

## 2025-08-12 LAB — AT III AG ACT/NOR PPP IA: 91 % (ref 82–136)

## 2025-08-13 ENCOUNTER — APPOINTMENT (OUTPATIENT)
Dept: PULMONOLOGY | Facility: HOSPITAL | Age: 30
End: 2025-08-13
Payer: MEDICAID

## 2025-08-13 ENCOUNTER — APPOINTMENT (OUTPATIENT)
Dept: BEHAVIORAL HEALTH | Facility: CLINIC | Age: 30
End: 2025-08-13
Payer: MEDICAID

## 2025-08-13 VITALS
OXYGEN SATURATION: 100 % | BODY MASS INDEX: 39.9 KG/M2 | TEMPERATURE: 97.9 F | WEIGHT: 248.24 LBS | RESPIRATION RATE: 16 BRPM | SYSTOLIC BLOOD PRESSURE: 127 MMHG | HEIGHT: 66 IN | DIASTOLIC BLOOD PRESSURE: 59 MMHG | HEART RATE: 80 BPM

## 2025-08-13 DIAGNOSIS — Z98.84 BARIATRIC SURGERY STATUS: Primary | ICD-10-CM

## 2025-08-13 PROCEDURE — 99213 OFFICE O/P EST LOW 20 MIN: CPT | Performed by: NURSE PRACTITIONER

## 2025-08-13 PROCEDURE — 3008F BODY MASS INDEX DOCD: CPT | Performed by: NURSE PRACTITIONER

## 2025-08-13 PROCEDURE — 99203 OFFICE O/P NEW LOW 30 MIN: CPT | Performed by: NURSE PRACTITIONER

## 2025-08-13 RX ORDER — ALBUTEROL SULFATE 0.83 MG/ML
3 SOLUTION RESPIRATORY (INHALATION) ONCE
OUTPATIENT
Start: 2025-08-13 | End: 2025-08-13

## 2025-08-13 RX ORDER — ALBUTEROL SULFATE 90 UG/1
1 INHALANT RESPIRATORY (INHALATION) ONCE
OUTPATIENT
Start: 2025-08-13

## 2025-08-13 ASSESSMENT — ENCOUNTER SYMPTOMS
OCCASIONAL FEELINGS OF UNSTEADINESS: 0
RHINORRHEA: 0
EYE PAIN: 0
FEVER: 0
ABDOMINAL PAIN: 0
NUMBNESS: 0
SINUS PRESSURE: 0
DIZZINESS: 1
MYALGIAS: 0
VOMITING: 0
AGITATION: 0
DIARRHEA: 0
LOSS OF SENSATION IN FEET: 0
NERVOUS/ANXIOUS: 1
DEPRESSION: 1
JOINT SWELLING: 0
PALPITATIONS: 0
ARTHRALGIAS: 0
FATIGUE: 0
BACK PAIN: 0
WEAKNESS: 0
NAUSEA: 0
VOICE CHANGE: 0
HEADACHES: 0

## 2025-08-13 ASSESSMENT — PAIN SCALES - GENERAL: PAINLEVEL_OUTOF10: 0-NO PAIN

## 2025-08-18 LAB
ELECTRONICALLY SIGNED BY: NORMAL
FACTOR V LEIDEN INTERPRETATION: NORMAL
FACTOR V LEIDEN RESULT: NORMAL

## 2025-08-20 ENCOUNTER — APPOINTMENT (OUTPATIENT)
Dept: BEHAVIORAL HEALTH | Facility: CLINIC | Age: 30
End: 2025-08-20
Payer: MEDICAID

## 2025-08-20 DIAGNOSIS — F43.10 PTSD (POST-TRAUMATIC STRESS DISORDER): ICD-10-CM

## 2025-08-20 DIAGNOSIS — F44.5 PSYCHOGENIC NONEPILEPTIC SEIZURE: ICD-10-CM

## 2025-08-20 DIAGNOSIS — F32.89 OTHER DEPRESSION: ICD-10-CM

## 2025-08-20 DIAGNOSIS — F41.8 OTHER SPECIFIED ANXIETY DISORDERS: ICD-10-CM

## 2025-08-20 PROCEDURE — 90832 PSYTX W PT 30 MINUTES: CPT | Performed by: PSYCHOLOGIST

## 2025-08-21 DIAGNOSIS — R89.9 ABNORMAL LABORATORY TEST RESULT: ICD-10-CM

## 2025-08-21 DIAGNOSIS — E55.9 VITAMIN D DEFICIENCY: ICD-10-CM

## 2025-08-21 DIAGNOSIS — Z98.84 H/O BARIATRIC SURGERY: ICD-10-CM

## 2025-08-25 ENCOUNTER — HOSPITAL ENCOUNTER (OUTPATIENT)
Dept: RESPIRATORY THERAPY | Facility: CLINIC | Age: 30
Discharge: HOME | End: 2025-08-25
Payer: MEDICAID

## 2025-08-25 DIAGNOSIS — Z98.84 BARIATRIC SURGERY STATUS: ICD-10-CM

## 2025-08-25 PROCEDURE — 94760 N-INVAS EAR/PLS OXIMETRY 1: CPT

## 2025-08-25 PROCEDURE — 9420000001 HC RT PATIENT EDUCATION 5 MIN

## 2025-08-25 PROCEDURE — 94664 DEMO&/EVAL PT USE INHALER: CPT | Mod: 59

## 2025-08-25 PROCEDURE — 94729 DIFFUSING CAPACITY: CPT | Performed by: INTERNAL MEDICINE

## 2025-08-25 PROCEDURE — 94727 GAS DIL/WSHOT DETER LNG VOL: CPT | Performed by: INTERNAL MEDICINE

## 2025-08-25 PROCEDURE — 94729 DIFFUSING CAPACITY: CPT

## 2025-08-25 PROCEDURE — 2500000002 HC RX 250 W HCPCS SELF ADMINISTERED DRUGS (ALT 637 FOR MEDICARE OP, ALT 636 FOR OP/ED): Performed by: NURSE PRACTITIONER

## 2025-08-25 PROCEDURE — 94060 EVALUATION OF WHEEZING: CPT | Performed by: INTERNAL MEDICINE

## 2025-08-25 PROCEDURE — 95012 NITRIC OXIDE EXP GAS DETER: CPT

## 2025-08-25 PROCEDURE — 94727 GAS DIL/WSHOT DETER LNG VOL: CPT

## 2025-08-25 PROCEDURE — 94060 EVALUATION OF WHEEZING: CPT

## 2025-08-25 RX ORDER — ALBUTEROL SULFATE 90 UG/1
1 INHALANT RESPIRATORY (INHALATION) ONCE
Status: COMPLETED | OUTPATIENT
Start: 2025-08-25 | End: 2025-08-25

## 2025-08-25 RX ORDER — ALBUTEROL SULFATE 0.83 MG/ML
3 SOLUTION RESPIRATORY (INHALATION) ONCE
Status: COMPLETED | OUTPATIENT
Start: 2025-08-25 | End: 2025-08-25

## 2025-08-25 RX ADMIN — ALBUTEROL SULFATE 3 ML: 2.5 SOLUTION RESPIRATORY (INHALATION) at 14:33

## 2025-08-28 ENCOUNTER — APPOINTMENT (OUTPATIENT)
Dept: SURGERY | Facility: CLINIC | Age: 30
End: 2025-08-28
Payer: MEDICAID

## 2025-09-04 ENCOUNTER — APPOINTMENT (OUTPATIENT)
Dept: SURGERY | Facility: CLINIC | Age: 30
End: 2025-09-04
Payer: MEDICAID

## 2025-10-29 ENCOUNTER — APPOINTMENT (OUTPATIENT)
Dept: SLEEP MEDICINE | Facility: CLINIC | Age: 30
End: 2025-10-29
Payer: MEDICAID